# Patient Record
Sex: FEMALE | Race: WHITE | NOT HISPANIC OR LATINO
[De-identification: names, ages, dates, MRNs, and addresses within clinical notes are randomized per-mention and may not be internally consistent; named-entity substitution may affect disease eponyms.]

---

## 2022-08-25 PROBLEM — Z00.00 ENCOUNTER FOR PREVENTIVE HEALTH EXAMINATION: Status: ACTIVE | Noted: 2022-08-25

## 2022-08-26 ENCOUNTER — NON-APPOINTMENT (OUTPATIENT)
Age: 52
End: 2022-08-26

## 2022-10-18 ENCOUNTER — APPOINTMENT (OUTPATIENT)
Dept: GASTROENTEROLOGY | Facility: CLINIC | Age: 52
End: 2022-10-18

## 2022-10-18 ENCOUNTER — NON-APPOINTMENT (OUTPATIENT)
Age: 52
End: 2022-10-18

## 2022-10-18 VITALS
HEIGHT: 58 IN | RESPIRATION RATE: 16 BRPM | OXYGEN SATURATION: 98 % | WEIGHT: 122 LBS | SYSTOLIC BLOOD PRESSURE: 120 MMHG | BODY MASS INDEX: 25.61 KG/M2 | DIASTOLIC BLOOD PRESSURE: 80 MMHG | HEART RATE: 70 BPM

## 2022-10-18 DIAGNOSIS — Z12.11 ENCOUNTER FOR SCREENING FOR MALIGNANT NEOPLASM OF COLON: ICD-10-CM

## 2022-10-18 PROCEDURE — 99204 OFFICE O/P NEW MOD 45 MIN: CPT

## 2022-10-18 PROCEDURE — 82272 OCCULT BLD FECES 1-3 TESTS: CPT

## 2022-10-18 NOTE — HISTORY OF PRESENT ILLNESS
[FreeTextEntry1] : Patient is here for screening colonoscopy she has no chronic medical conditions.  She was sent for colonoscopy by her primary MD Nisha Branch and her GYN Dr. Richards\par \par Patient has no constipation diarrhea black stools bloody stools abdominal pain change in bowel habits or unintentional weight loss.  She has no family history of colon cancer or colon polyps in a first-degree relative.  This is her first colonoscopy.  She has a grandmother with colon cancer.\par     \par \par Patient has history of internal hemorrhoids which she states began during the time of childbirth.  States every once in a while she gets some rectal irritation if she has had frequent bowel movements.  She gets no rectal bleeding.  Symptoms resolved with Preparation H.

## 2022-10-18 NOTE — ASSESSMENT
[FreeTextEntry1] : A/P\par Screening colonoscopy\par MiraLAX prep\par  I discussed the risks and benefits of colonoscopy and patient was given opportunity to ask questions. Colonoscopy to r/o colon cancer, polyps, AVM's. Patient is medically optimized for the procedure\par -We will get CBC CMP from primary MD Nisha Branch \par \par hemorrhoid\par preparation H prn

## 2022-10-18 NOTE — PHYSICAL EXAM
[Alert] : alert [Normal Voice/Communication] : normal voice/communication [Healthy Appearing] : healthy appearing [No Acute Distress] : no acute distress [Sclera] : the sclera and conjunctiva were normal [No Respiratory Distress] : no respiratory distress [No Acc Muscle Use] : no accessory muscle use [Respiration, Rhythm And Depth] : normal respiratory rhythm and effort [Auscultation Breath Sounds / Voice Sounds] : lungs were clear to auscultation bilaterally [Normal S1, S2] : normal S1 and S2 [Heart Rate And Rhythm] : heart rate was normal and rhythm regular [Murmurs] : no murmurs [Bowel Sounds] : normal bowel sounds [Abdomen Tenderness] : non-tender [No Masses] : no abdominal mass palpated [Abdomen Soft] : soft [Oriented To Time, Place, And Person] : oriented to person, place, and time [Normal Sphincter Tone] : normal sphincter tone [No External Hemorrhoid] : no external hemorrhoids [No Rectal Mass] : no rectal mass [Occult Blood] : negative occult blood

## 2022-11-01 ENCOUNTER — TRANSCRIPTION ENCOUNTER (OUTPATIENT)
Age: 52
End: 2022-11-01

## 2023-02-16 ENCOUNTER — APPOINTMENT (OUTPATIENT)
Dept: GASTROENTEROLOGY | Facility: GI CENTER | Age: 53
End: 2023-02-16

## 2023-02-22 ENCOUNTER — NON-APPOINTMENT (OUTPATIENT)
Age: 53
End: 2023-02-22

## 2023-03-02 ENCOUNTER — NON-APPOINTMENT (OUTPATIENT)
Age: 53
End: 2023-03-02

## 2023-03-02 ENCOUNTER — APPOINTMENT (OUTPATIENT)
Dept: SPINE | Facility: CLINIC | Age: 53
End: 2023-03-02
Payer: COMMERCIAL

## 2023-03-02 VITALS
SYSTOLIC BLOOD PRESSURE: 115 MMHG | BODY MASS INDEX: 25.61 KG/M2 | OXYGEN SATURATION: 97 % | DIASTOLIC BLOOD PRESSURE: 78 MMHG | HEART RATE: 69 BPM | HEIGHT: 58 IN | WEIGHT: 122 LBS

## 2023-03-02 DIAGNOSIS — M50.20 OTHER CERVICAL DISC DISPLACEMENT, UNSPECIFIED CERVICAL REGION: ICD-10-CM

## 2023-03-02 DIAGNOSIS — Z82.49 FAMILY HISTORY OF ISCHEMIC HEART DISEASE AND OTHER DISEASES OF THE CIRCULATORY SYSTEM: ICD-10-CM

## 2023-03-02 DIAGNOSIS — Z80.9 FAMILY HISTORY OF MALIGNANT NEOPLASM, UNSPECIFIED: ICD-10-CM

## 2023-03-02 PROCEDURE — 99204 OFFICE O/P NEW MOD 45 MIN: CPT

## 2023-03-02 NOTE — DATA REVIEWED
[de-identified] : Cervical spine from [facility] on 2/16/2023 [de-identified] : Cervical spine from [facility] on 2/10/2023

## 2023-03-02 NOTE — ASSESSMENT
[FreeTextEntry1] : 52 year old female with left cervical radiculopathy secondary to large C6-7 disc herniation.  There is also some left C5-6 foraminal encroachment.  I have recommended she consider a C6-7 total disc replacement and possible C5-6 total disc replacement.  I would like to evaluate a CT scan and flexion-extension x-rays to determine whether at the C5-6 level needs to be addressed at the same time. Surgery was discussed in detail. Surgery was a result of shared decision making, the patient had no benefit from conservative- non surgical treatment, and all surgical options were reviewed. Risks related of surgery were discussed in detail - including but not limited to CSF leak, infection, nerve damage, temporary or permanent weakness or numbness, hemorrhage, cardiac events, and rarely even death, among others. Repair of CSF leak explained. An explanation of hardware/instrumentation that will be placed in the spine was reviewed and understood. \par \par I, Dr. Mick Hinds, evaluated this patient with Nurse Practitioner, Pat Gibson, and established the plan of care. I personally discussed this patient during the key portions of the history and exam with the Nurse Practitioner at the time of the visit. I agree with the assessment and plan as written, unless noted below.

## 2023-03-02 NOTE — PHYSICAL EXAM
[Person] : oriented to person [Place] : oriented to place [Time] : oriented to time [Short Term Intact] : short term memory intact [Remote Intact] : remote memory intact [Span Intact] : the attention span was normal [Concentration Intact] : normal concentrating ability [Fluency] : fluency intact [Comprehension] : comprehension intact [Current Events] : adequate knowledge of current events [Past History] : adequate knowledge of personal past history [Vocabulary] : adequate range of vocabulary [Cranial Nerves Optic (II)] : visual acuity intact bilaterally,  pupils equal round and reactive to light [Cranial Nerves Oculomotor (III)] : extraocular motion intact [Cranial Nerves Trigeminal (V)] : facial sensation intact symmetrically [Cranial Nerves Facial (VII)] : face symmetrical [Cranial Nerves Vestibulocochlear (VIII)] : hearing was intact bilaterally [Cranial Nerves Glossopharyngeal (IX)] : tongue and palate midline [Cranial Nerves Accessory (XI - Cranial And Spinal)] : head turning and shoulder shrug symmetric [Cranial Nerves Hypoglossal (XII)] : there was no tongue deviation with protrusion [Motor Tone] : muscle tone was normal in all four extremities [No Muscle Atrophy] : normal bulk in all four extremities [Abnormal Walk] : normal gait [Balance] : balance was intact [2+] : Ankle jerk left 2+ [Spurling's Same Side] : Positive Spurling's on same side [Past-pointing] : there was no past-pointing [Tremor] : no tremor present [Gracia] : Gracia's sign was not demonstrated [FreeTextEntry6] : Left tricep and  weakness 4/5 [FreeTextEntry7] : Decreased light touch left C7 [L'Hermitte's] : neck flexion did not produce tingling down the spine/arms [Spurling's - Opposite Side] : Negative Spurling's on opposite side

## 2023-03-02 NOTE — HISTORY OF PRESENT ILLNESS
[< 3 months] : less than 3 months [FreeTextEntry1] : Left scapula, left arm pain and left arm weakness [de-identified] : Ms. Bravo is a 52 year old right handed female here today for a second opinion. The patient states she woke up 3 weeks ago with pain in the left scapula, and left arm with radiation into the 3rd digit. The patient also reports triceps weakness, tingling in left hand and numbness of the left 2nd digit. Denies any trauma or injury. She works as a music  teacher and her symptoms are affecting her ability to perform her duties. She has completed 2 weeks of physical therapy and it is ongoing with minimal improvement. She has received 4 sessions of acupuncture and chiropractic therapy with no improvement. She is under the care of Dr. Mick Betts for pain management and has received a cervical epidural injection with significant improvement of her pain. The left arm weakness or paresthesia of hand are unchanged. Pain level prior to epidural injection was 8-9/10. Her current pain level is 0/10. The patient states she experiences some pain in the morning and takes Meloxicam 15 mg and Baclofen 10 mg with relief. \par Denies any trouble with her balance, bladder or bowel dysfunction. She had an EMG done yesterday (no report at this time).  Recent MRI scan shows a large left C6-7 disc herniation with significant impingement of the left side of the cord in the left C7 nerve root.  There is also some foraminal narrowing on the left at C5-6.

## 2023-03-02 NOTE — REASON FOR VISIT
[New Patient Visit] : a new patient visit [Other: _____] : [unfilled] [FreeTextEntry1] : Left scapula, left arm pain, and left arm weakness

## 2023-03-03 ENCOUNTER — NON-APPOINTMENT (OUTPATIENT)
Age: 53
End: 2023-03-03

## 2023-03-07 ENCOUNTER — APPOINTMENT (OUTPATIENT)
Dept: RADIOLOGY | Facility: CLINIC | Age: 53
End: 2023-03-07
Payer: COMMERCIAL

## 2023-03-07 ENCOUNTER — APPOINTMENT (OUTPATIENT)
Dept: CT IMAGING | Facility: CLINIC | Age: 53
End: 2023-03-07
Payer: COMMERCIAL

## 2023-03-07 PROCEDURE — 72125 CT NECK SPINE W/O DYE: CPT

## 2023-03-07 PROCEDURE — 72052 X-RAY EXAM NECK SPINE 6/>VWS: CPT

## 2023-03-08 ENCOUNTER — NON-APPOINTMENT (OUTPATIENT)
Age: 53
End: 2023-03-08

## 2023-03-09 ENCOUNTER — NON-APPOINTMENT (OUTPATIENT)
Age: 53
End: 2023-03-09

## 2023-03-29 ENCOUNTER — OUTPATIENT (OUTPATIENT)
Dept: OUTPATIENT SERVICES | Facility: HOSPITAL | Age: 53
LOS: 1 days | End: 2023-03-29
Payer: COMMERCIAL

## 2023-03-29 VITALS
OXYGEN SATURATION: 97 % | HEIGHT: 58 IN | HEART RATE: 90 BPM | DIASTOLIC BLOOD PRESSURE: 81 MMHG | WEIGHT: 121.03 LBS | TEMPERATURE: 98 F | RESPIRATION RATE: 18 BRPM | SYSTOLIC BLOOD PRESSURE: 119 MMHG

## 2023-03-29 DIAGNOSIS — M50.20 OTHER CERVICAL DISC DISPLACEMENT, UNSPECIFIED CERVICAL REGION: ICD-10-CM

## 2023-03-29 DIAGNOSIS — Z01.818 ENCOUNTER FOR OTHER PREPROCEDURAL EXAMINATION: ICD-10-CM

## 2023-03-29 DIAGNOSIS — Z98.891 HISTORY OF UTERINE SCAR FROM PREVIOUS SURGERY: Chronic | ICD-10-CM

## 2023-03-29 LAB
ANION GAP SERPL CALC-SCNC: 10 MMOL/L — SIGNIFICANT CHANGE UP (ref 5–17)
BLD GP AB SCN SERPL QL: NEGATIVE — SIGNIFICANT CHANGE UP
BUN SERPL-MCNC: 18 MG/DL — SIGNIFICANT CHANGE UP (ref 7–23)
CALCIUM SERPL-MCNC: 9.6 MG/DL — SIGNIFICANT CHANGE UP (ref 8.4–10.5)
CHLORIDE SERPL-SCNC: 104 MMOL/L — SIGNIFICANT CHANGE UP (ref 96–108)
CO2 SERPL-SCNC: 24 MMOL/L — SIGNIFICANT CHANGE UP (ref 22–31)
CREAT SERPL-MCNC: 0.65 MG/DL — SIGNIFICANT CHANGE UP (ref 0.5–1.3)
EGFR: 107 ML/MIN/1.73M2 — SIGNIFICANT CHANGE UP
GLUCOSE SERPL-MCNC: 96 MG/DL — SIGNIFICANT CHANGE UP (ref 70–99)
HCT VFR BLD CALC: 37.6 % — SIGNIFICANT CHANGE UP (ref 34.5–45)
HGB BLD-MCNC: 12.9 G/DL — SIGNIFICANT CHANGE UP (ref 11.5–15.5)
MCHC RBC-ENTMCNC: 31.6 PG — SIGNIFICANT CHANGE UP (ref 27–34)
MCHC RBC-ENTMCNC: 34.3 GM/DL — SIGNIFICANT CHANGE UP (ref 32–36)
MCV RBC AUTO: 92.2 FL — SIGNIFICANT CHANGE UP (ref 80–100)
NRBC # BLD: 0 /100 WBCS — SIGNIFICANT CHANGE UP (ref 0–0)
PLATELET # BLD AUTO: 336 K/UL — SIGNIFICANT CHANGE UP (ref 150–400)
POTASSIUM SERPL-MCNC: 3.8 MMOL/L — SIGNIFICANT CHANGE UP (ref 3.5–5.3)
POTASSIUM SERPL-SCNC: 3.8 MMOL/L — SIGNIFICANT CHANGE UP (ref 3.5–5.3)
RBC # BLD: 4.08 M/UL — SIGNIFICANT CHANGE UP (ref 3.8–5.2)
RBC # FLD: 12 % — SIGNIFICANT CHANGE UP (ref 10.3–14.5)
RH IG SCN BLD-IMP: POSITIVE — SIGNIFICANT CHANGE UP
SODIUM SERPL-SCNC: 138 MMOL/L — SIGNIFICANT CHANGE UP (ref 135–145)
WBC # BLD: 8.15 K/UL — SIGNIFICANT CHANGE UP (ref 3.8–10.5)
WBC # FLD AUTO: 8.15 K/UL — SIGNIFICANT CHANGE UP (ref 3.8–10.5)

## 2023-03-29 PROCEDURE — 87640 STAPH A DNA AMP PROBE: CPT

## 2023-03-29 PROCEDURE — 80048 BASIC METABOLIC PNL TOTAL CA: CPT

## 2023-03-29 PROCEDURE — 86900 BLOOD TYPING SEROLOGIC ABO: CPT

## 2023-03-29 PROCEDURE — G0463: CPT

## 2023-03-29 PROCEDURE — 86850 RBC ANTIBODY SCREEN: CPT

## 2023-03-29 PROCEDURE — 85027 COMPLETE CBC AUTOMATED: CPT

## 2023-03-29 PROCEDURE — 87641 MR-STAPH DNA AMP PROBE: CPT

## 2023-03-29 PROCEDURE — 86901 BLOOD TYPING SEROLOGIC RH(D): CPT

## 2023-03-29 NOTE — H&P PST ADULT - ASSESSMENT
DASI score:  DASI activity: walks 40 mins daily, cleaning  Loose teeth or denture: no     MP    ALFREDOI VTE 2.0 SCORE [CLOT updated 2019]    AGE RELATED RISK FACTORS                                                       MOBILITY RELATED FACTORS  [x ] Age 41-60 years                                            (1 Point)                    [ ] Bed rest                                                        (1 Point)  [ ] Age: 61-74 years                                           (2 Points)                  [ ] Plaster cast                                                   (2 Points)  [ ] Age= 75 years                                              (3 Points)                    [ ] Bed bound for more than 72 hours                 (2 Points)    DISEASE RELATED RISK FACTORS                                               GENDER SPECIFIC FACTORS  [ ] Edema in the lower extremities                       (1 Point)              [ ] Pregnancy                                                     (1 Point)  [ ] Varicose veins                                               (1 Point)                     [ ] Post-partum < 6 weeks                                   (1 Point)             [x ] BMI > 25 Kg/m2                                            (1 Point)                     [ ] Hormonal therapy  or oral contraception          (1 Point)                 [ ] Sepsis (in the previous month)                        (1 Point)               [ ] History of pregnancy complications                 (1 point)  [ ] Pneumonia or serious lung disease                                               [ ] Unexplained or recurrent                     (1 Point)           (in the previous month)                               (1 Point)  [ ] Abnormal pulmonary function test                     (1 Point)                 SURGERY RELATED RISK FACTORS  [ ] Acute myocardial infarction                              (1 Point)               [ ]  Section                                             (1 Point)  [ ] Congestive heart failure (in the previous month)  (1 Point)      [ ] Minor surgery                                                  (1 Point)   [ ] Inflammatory bowel disease                             (1 Point)               [ ] Arthroscopic surgery                                        (2 Points)  [ ] Central venous access                                      (2 Points)                [x ] General surgery lasting more than 45 minutes (2 points)  [ ] Malignancy- Present or previous                   (2 Points)                [ ] Elective arthroplasty                                         (5 points)    [ ] Stroke (in the previous month)                          (5 Points)                                                                                                                                                           HEMATOLOGY RELATED FACTORS                                                 TRAUMA RELATED RISK FACTORS  [ ] Prior episodes of VTE                                     (3 Points)                [ ] Fracture of the hip, pelvis, or leg                       (5 Points)  [ ] Positive family history for VTE                         (3 Points)             [ ] Acute spinal cord injury (in the previous month)  (5 Points)  [ ] Prothrombin 42275 A                                     (3 Points)               [ ] Paralysis  (less than 1 month)                             (5 Points)  [ ] Factor V Leiden                                             (3 Points)                  [ ] Multiple Trauma within 1 month                        (5 Points)  [ ] Lupus anticoagulants                                     (3 Points)                                                           [ ] Anticardiolipin antibodies                               (3 Points)                                                       [ ] High homocysteine in the blood                      (3 Points)                                             [ ] Other congenital or acquired thrombophilia      (3 Points)                                                [ ] Heparin induced thrombocytopenia                  (3 Points)                                     Total Score [          ] DASI score:  DASI activity: walks 40 mins daily, cleaning  Loose teeth or denture: no     MP    ALFREDOI VTE 2.0 SCORE [CLOT updated 2019]    AGE RELATED RISK FACTORS                                                       MOBILITY RELATED FACTORS  [x ] Age 41-60 years                                            (1 Point)                    [ ] Bed rest                                                        (1 Point)  [ ] Age: 61-74 years                                           (2 Points)                  [ ] Plaster cast                                                   (2 Points)  [ ] Age= 75 years                                              (3 Points)                    [ ] Bed bound for more than 72 hours                 (2 Points)    DISEASE RELATED RISK FACTORS                                               GENDER SPECIFIC FACTORS  [ ] Edema in the lower extremities                       (1 Point)              [ ] Pregnancy                                                     (1 Point)  [ ] Varicose veins                                               (1 Point)                     [ ] Post-partum < 6 weeks                                   (1 Point)             [x ] BMI > 25 Kg/m2                                            (1 Point)                     [ ] Hormonal therapy  or oral contraception          (1 Point)                 [ ] Sepsis (in the previous month)                        (1 Point)               [ ] History of pregnancy complications                 (1 point)  [ ] Pneumonia or serious lung disease                                               [ ] Unexplained or recurrent                     (1 Point)           (in the previous month)                               (1 Point)  [ ] Abnormal pulmonary function test                     (1 Point)                 SURGERY RELATED RISK FACTORS  [ ] Acute myocardial infarction                              (1 Point)               [ ]  Section                                             (1 Point)  [ ] Congestive heart failure (in the previous month)  (1 Point)      [ ] Minor surgery                                                  (1 Point)   [ ] Inflammatory bowel disease                             (1 Point)               [ ] Arthroscopic surgery                                        (2 Points)  [ ] Central venous access                                      (2 Points)                [x ] General surgery lasting more than 45 minutes (2 points)  [ ] Malignancy- Present or previous                   (2 Points)                [ ] Elective arthroplasty                                         (5 points)    [ ] Stroke (in the previous month)                          (5 Points)                                                                                                                                                           HEMATOLOGY RELATED FACTORS                                                 TRAUMA RELATED RISK FACTORS  [ ] Prior episodes of VTE                                     (3 Points)                [ ] Fracture of the hip, pelvis, or leg                       (5 Points)  [ ] Positive family history for VTE                         (3 Points)             [ ] Acute spinal cord injury (in the previous month)  (5 Points)  [ ] Prothrombin 53325 A                                     (3 Points)               [ ] Paralysis  (less than 1 month)                             (5 Points)  [ ] Factor V Leiden                                             (3 Points)                  [ ] Multiple Trauma within 1 month                        (5 Points)  [ ] Lupus anticoagulants                                     (3 Points)                                                           [ ] Anticardiolipin antibodies                               (3 Points)                                                       [ ] High homocysteine in the blood                      (3 Points)                                             [ ] Other congenital or acquired thrombophilia      (3 Points)                                                [ ] Heparin induced thrombocytopenia                  (3 Points)                                     Total Score [    4      ]

## 2023-03-29 NOTE — H&P PST ADULT - HISTORY OF PRESENT ILLNESS
53 yo F with PMHx significant herniated cervical disc who reports a few month history of left scapula and left arm pain associated with left arm weakness and numbness in left index finger. She underwent MRI which showed "herniated cervical disc." Conservative therapies include rest, ice, Baclofen, Meloxicam, PT, acupuncture, chiropractor and epidural injections with minimal relief of symptoms. She is scheduled for C5-6 and C6-7 Total Disc Replacement Anterior on 4/18/2023.     Denies Recent travel, Exposure or Covid symptoms  Covid test 4/15/23

## 2023-03-30 LAB
MRSA PCR RESULT.: SIGNIFICANT CHANGE UP
S AUREUS DNA NOSE QL NAA+PROBE: SIGNIFICANT CHANGE UP

## 2023-03-31 PROBLEM — M50.20 OTHER CERVICAL DISC DISPLACEMENT, UNSPECIFIED CERVICAL REGION: Chronic | Status: ACTIVE | Noted: 2023-03-29

## 2023-04-17 ENCOUNTER — TRANSCRIPTION ENCOUNTER (OUTPATIENT)
Age: 53
End: 2023-04-17

## 2023-04-18 ENCOUNTER — INPATIENT (INPATIENT)
Facility: HOSPITAL | Age: 53
LOS: 0 days | Discharge: HOME | DRG: 518 | End: 2023-04-19
Attending: NEUROLOGICAL SURGERY | Admitting: NEUROLOGICAL SURGERY
Payer: COMMERCIAL

## 2023-04-18 ENCOUNTER — APPOINTMENT (OUTPATIENT)
Dept: SPINE | Facility: HOSPITAL | Age: 53
End: 2023-04-18

## 2023-04-18 VITALS
SYSTOLIC BLOOD PRESSURE: 138 MMHG | OXYGEN SATURATION: 99 % | HEIGHT: 58 IN | DIASTOLIC BLOOD PRESSURE: 84 MMHG | WEIGHT: 121.03 LBS | RESPIRATION RATE: 17 BRPM | HEART RATE: 76 BPM | TEMPERATURE: 98 F

## 2023-04-18 DIAGNOSIS — M50.20 OTHER CERVICAL DISC DISPLACEMENT, UNSPECIFIED CERVICAL REGION: ICD-10-CM

## 2023-04-18 DIAGNOSIS — Z98.891 HISTORY OF UTERINE SCAR FROM PREVIOUS SURGERY: Chronic | ICD-10-CM

## 2023-04-18 DIAGNOSIS — Z98.51 TUBAL LIGATION STATUS: Chronic | ICD-10-CM

## 2023-04-18 LAB — RH IG SCN BLD-IMP: POSITIVE — SIGNIFICANT CHANGE UP

## 2023-04-18 PROCEDURE — 22856 TOT DISC ARTHRP 1NTRSPC CRV: CPT | Mod: GC

## 2023-04-18 PROCEDURE — 22858 TOT DISC ARTHRP 2ND LVL CRV: CPT | Mod: GC

## 2023-04-18 DEVICE — KIT A-LINE 1LUM 20G X 12CM SAFE KIT: Type: IMPLANTABLE DEVICE | Status: FUNCTIONAL

## 2023-04-18 DEVICE — SURGIFOAM PAD 8CM X 12.5CM X 10MM (100): Type: IMPLANTABLE DEVICE | Status: FUNCTIONAL

## 2023-04-18 DEVICE — DISC MOBI-C CERV 15X15 H5: Type: IMPLANTABLE DEVICE | Status: FUNCTIONAL

## 2023-04-18 DEVICE — DISTRACTION PIN 14MM (YELLOW): Type: IMPLANTABLE DEVICE | Status: FUNCTIONAL

## 2023-04-18 DEVICE — SURGIFLO MATRIX WITH THROMBIN KIT: Type: IMPLANTABLE DEVICE | Status: FUNCTIONAL

## 2023-04-18 DEVICE — DISC MOBI-C CERV 13X15 H5: Type: IMPLANTABLE DEVICE | Status: FUNCTIONAL

## 2023-04-18 RX ORDER — HYDROMORPHONE HYDROCHLORIDE 2 MG/ML
0.5 INJECTION INTRAMUSCULAR; INTRAVENOUS; SUBCUTANEOUS
Refills: 0 | Status: DISCONTINUED | OUTPATIENT
Start: 2023-04-18 | End: 2023-04-18

## 2023-04-18 RX ORDER — HYDROMORPHONE HYDROCHLORIDE 2 MG/ML
0.5 INJECTION INTRAMUSCULAR; INTRAVENOUS; SUBCUTANEOUS EVERY 4 HOURS
Refills: 0 | Status: DISCONTINUED | OUTPATIENT
Start: 2023-04-18 | End: 2023-04-19

## 2023-04-18 RX ORDER — ONDANSETRON 8 MG/1
4 TABLET, FILM COATED ORAL ONCE
Refills: 0 | Status: DISCONTINUED | OUTPATIENT
Start: 2023-04-18 | End: 2023-04-18

## 2023-04-18 RX ORDER — SODIUM CHLORIDE 9 MG/ML
1000 INJECTION, SOLUTION INTRAVENOUS
Refills: 0 | Status: DISCONTINUED | OUTPATIENT
Start: 2023-04-18 | End: 2023-04-18

## 2023-04-18 RX ORDER — CEFAZOLIN SODIUM 1 G
2000 VIAL (EA) INJECTION EVERY 8 HOURS
Refills: 0 | Status: COMPLETED | OUTPATIENT
Start: 2023-04-18 | End: 2023-04-19

## 2023-04-18 RX ORDER — CEFAZOLIN SODIUM 1 G
2000 VIAL (EA) INJECTION ONCE
Refills: 0 | Status: COMPLETED | OUTPATIENT
Start: 2023-04-18 | End: 2023-04-18

## 2023-04-18 RX ORDER — APREPITANT 80 MG/1
40 CAPSULE ORAL ONCE
Refills: 0 | Status: COMPLETED | OUTPATIENT
Start: 2023-04-18 | End: 2023-04-18

## 2023-04-18 RX ORDER — OXYCODONE HYDROCHLORIDE 5 MG/1
5 TABLET ORAL EVERY 4 HOURS
Refills: 0 | Status: DISCONTINUED | OUTPATIENT
Start: 2023-04-18 | End: 2023-04-19

## 2023-04-18 RX ORDER — FAMOTIDINE 10 MG/ML
20 INJECTION INTRAVENOUS EVERY 12 HOURS
Refills: 0 | Status: DISCONTINUED | OUTPATIENT
Start: 2023-04-18 | End: 2023-04-19

## 2023-04-18 RX ORDER — METHOCARBAMOL 500 MG/1
500 TABLET, FILM COATED ORAL EVERY 8 HOURS
Refills: 0 | Status: DISCONTINUED | OUTPATIENT
Start: 2023-04-18 | End: 2023-04-19

## 2023-04-18 RX ORDER — SENNA PLUS 8.6 MG/1
2 TABLET ORAL AT BEDTIME
Refills: 0 | Status: DISCONTINUED | OUTPATIENT
Start: 2023-04-18 | End: 2023-04-19

## 2023-04-18 RX ORDER — ONDANSETRON 8 MG/1
4 TABLET, FILM COATED ORAL EVERY 6 HOURS
Refills: 0 | Status: DISCONTINUED | OUTPATIENT
Start: 2023-04-18 | End: 2023-04-19

## 2023-04-18 RX ORDER — SODIUM CHLORIDE 9 MG/ML
1000 INJECTION INTRAMUSCULAR; INTRAVENOUS; SUBCUTANEOUS
Refills: 0 | Status: DISCONTINUED | OUTPATIENT
Start: 2023-04-18 | End: 2023-04-19

## 2023-04-18 RX ORDER — DEXAMETHASONE 0.5 MG/5ML
4 ELIXIR ORAL EVERY 6 HOURS
Refills: 0 | Status: COMPLETED | OUTPATIENT
Start: 2023-04-18 | End: 2023-04-19

## 2023-04-18 RX ORDER — GABAPENTIN 400 MG/1
300 CAPSULE ORAL
Refills: 0 | Status: DISCONTINUED | OUTPATIENT
Start: 2023-04-18 | End: 2023-04-19

## 2023-04-18 RX ORDER — ACETAMINOPHEN 500 MG
1000 TABLET ORAL EVERY 8 HOURS
Refills: 0 | Status: DISCONTINUED | OUTPATIENT
Start: 2023-04-18 | End: 2023-04-19

## 2023-04-18 RX ORDER — POLYETHYLENE GLYCOL 3350 17 G/17G
17 POWDER, FOR SOLUTION ORAL DAILY
Refills: 0 | Status: DISCONTINUED | OUTPATIENT
Start: 2023-04-18 | End: 2023-04-19

## 2023-04-18 RX ADMIN — OXYCODONE HYDROCHLORIDE 5 MILLIGRAM(S): 5 TABLET ORAL at 22:02

## 2023-04-18 RX ADMIN — HYDROMORPHONE HYDROCHLORIDE 0.5 MILLIGRAM(S): 2 INJECTION INTRAMUSCULAR; INTRAVENOUS; SUBCUTANEOUS at 22:19

## 2023-04-18 RX ADMIN — METHOCARBAMOL 500 MILLIGRAM(S): 500 TABLET, FILM COATED ORAL at 20:58

## 2023-04-18 RX ADMIN — Medication 4 MILLIGRAM(S): at 19:51

## 2023-04-18 RX ADMIN — Medication 100 MILLIGRAM(S): at 18:29

## 2023-04-18 RX ADMIN — Medication 4 MILLIGRAM(S): at 23:51

## 2023-04-18 RX ADMIN — OXYCODONE HYDROCHLORIDE 5 MILLIGRAM(S): 5 TABLET ORAL at 21:02

## 2023-04-18 RX ADMIN — HYDROMORPHONE HYDROCHLORIDE 0.5 MILLIGRAM(S): 2 INJECTION INTRAMUSCULAR; INTRAVENOUS; SUBCUTANEOUS at 13:11

## 2023-04-18 RX ADMIN — HYDROMORPHONE HYDROCHLORIDE 0.5 MILLIGRAM(S): 2 INJECTION INTRAMUSCULAR; INTRAVENOUS; SUBCUTANEOUS at 23:19

## 2023-04-18 RX ADMIN — GABAPENTIN 300 MILLIGRAM(S): 400 CAPSULE ORAL at 19:51

## 2023-04-18 RX ADMIN — HYDROMORPHONE HYDROCHLORIDE 0.5 MILLIGRAM(S): 2 INJECTION INTRAMUSCULAR; INTRAVENOUS; SUBCUTANEOUS at 12:56

## 2023-04-18 RX ADMIN — APREPITANT 40 MILLIGRAM(S): 80 CAPSULE ORAL at 07:56

## 2023-04-18 NOTE — PROGRESS NOTE ADULT - ASSESSMENT
Patient seen and examined s/p C5-7 TDR. Doing well. Awake, alert, oriented x 3. SOTOMAYOR strongly. SILT. Wound intact; no swelling or hematoma    -No drain  -PACU for 6 hours and then floor  -Decadron 4q6  -HOB elevated 30 degrees at all times.  -Q4 neurochecks  -Q4 vitals  -Pain control  -Advance diet as tolerated  -PT/OT

## 2023-04-18 NOTE — PHYSICAL THERAPY INITIAL EVALUATION ADULT - ADDITIONAL COMMENTS
Pt lives in a house with  and dtr. Has 5 steps to enter (+HR) and 5+5 steps inside (+HR). Reports being independent with functional mobility/ADLs without AD. +drive, +work.

## 2023-04-18 NOTE — PATIENT PROFILE ADULT - FALL HARM RISK - RISK INTERVENTIONS
Assistance OOB with selected safe patient handling equipment/Assistance with ambulation/Communicate Fall Risk and Risk Factors to all staff, patient, and family/Monitor gait and stability/Reinforce activity limits and safety measures with patient and family/Sit up slowly, dangle for a short time, stand at bedside before walking/Use of alarms - bed, chair and/or voice tab/Visual Cue: Yellow wristband/Bed in lowest position, wheels locked, appropriate side rails in place/Call bell, personal items and telephone in reach/Instruct patient to call for assistance before getting out of bed or chair/Non-slip footwear when patient is out of bed/[place] to call system/Physically safe environment - no spills, clutter or unnecessary equipment/Purposeful Proactive Rounding/Room/bathroom lighting operational, light cord in reach

## 2023-04-18 NOTE — PATIENT PROFILE ADULT - FALL HARM RISK - UNIVERSAL INTERVENTIONS
Bed in lowest position, wheels locked, appropriate side rails in place/Call bell, personal items and telephone in reach/Instruct patient to call for assistance before getting out of bed or chair/Non-slip footwear when patient is out of bed/[place] to call system/Physically safe environment - no spills, clutter or unnecessary equipment/Purposeful Proactive Rounding/Room/bathroom lighting operational, light cord in reach

## 2023-04-18 NOTE — PROGRESS NOTE ADULT - SUBJECTIVE AND OBJECTIVE BOX
Patient seen and examined s/p C5-7 TDR. Doing well. Awake, alert, oriented x 3. SOTOMAYOR strongly. SILT. Wound intact; no swelling or hematoma    T(C): 36.5 (04-18-23 @ 16:00), Max: 36.7 (04-18-23 @ 07:07)  HR: 85 (04-18-23 @ 16:15) (69 - 92)  BP: 107/67 (04-18-23 @ 16:15) (100/55 - 138/84)  RR: 18 (04-18-23 @ 16:00) (16 - 18)  SpO2: 95% (04-18-23 @ 16:15) (93% - 99%)                    CAPILLARY BLOOD GLUCOSE

## 2023-04-18 NOTE — PHYSICAL THERAPY INITIAL EVALUATION ADULT - PLANNED THERAPY INTERVENTIONS, PT EVAL
Goal: Pt will be able to negotiate one flight of stairs independently with single handrail and step over step pattern in 1 week./balance training/bed mobility training/gait training/strengthening/transfer training

## 2023-04-18 NOTE — PHYSICAL THERAPY INITIAL EVALUATION ADULT - GENERAL OBSERVATIONS, REHAB EVAL
Pt rec'd semisupine in bed, in NAD, +IV, +a-line, +PACU monitoring. Agreeable to PT. RN cleared pt to be seen.

## 2023-04-18 NOTE — PHYSICAL THERAPY INITIAL EVALUATION ADULT - PERTINENT HX OF CURRENT PROBLEM, REHAB EVAL
51 yo F with PMHx significant herniated cervical disc who reports a few month history of left scapula and left arm pain associated with left arm weakness and numbness in left index finger. She underwent MRI which showed "herniated cervical disc." Conservative therapies include rest, ice, Baclofen, Meloxicam, PT, acupuncture, chiropractor and epidural injections with minimal relief of symptoms. She is scheduled for C5-6 and C6-7 Total Disc Replacement Anterior on 4/18/2023.

## 2023-04-19 ENCOUNTER — TRANSCRIPTION ENCOUNTER (OUTPATIENT)
Age: 53
End: 2023-04-19

## 2023-04-19 VITALS
HEART RATE: 85 BPM | SYSTOLIC BLOOD PRESSURE: 115 MMHG | TEMPERATURE: 99 F | OXYGEN SATURATION: 96 % | RESPIRATION RATE: 18 BRPM | DIASTOLIC BLOOD PRESSURE: 72 MMHG

## 2023-04-19 LAB
ANION GAP SERPL CALC-SCNC: 10 MMOL/L — SIGNIFICANT CHANGE UP (ref 5–17)
BASOPHILS # BLD AUTO: 0.02 K/UL — SIGNIFICANT CHANGE UP (ref 0–0.2)
BASOPHILS NFR BLD AUTO: 0.1 % — SIGNIFICANT CHANGE UP (ref 0–2)
BUN SERPL-MCNC: 9 MG/DL — SIGNIFICANT CHANGE UP (ref 7–23)
CALCIUM SERPL-MCNC: 8.6 MG/DL — SIGNIFICANT CHANGE UP (ref 8.4–10.5)
CHLORIDE SERPL-SCNC: 106 MMOL/L — SIGNIFICANT CHANGE UP (ref 96–108)
CO2 SERPL-SCNC: 21 MMOL/L — LOW (ref 22–31)
CREAT SERPL-MCNC: 0.61 MG/DL — SIGNIFICANT CHANGE UP (ref 0.5–1.3)
EGFR: 108 ML/MIN/1.73M2 — SIGNIFICANT CHANGE UP
EOSINOPHIL # BLD AUTO: 0 K/UL — SIGNIFICANT CHANGE UP (ref 0–0.5)
EOSINOPHIL NFR BLD AUTO: 0 % — SIGNIFICANT CHANGE UP (ref 0–6)
GLUCOSE SERPL-MCNC: 144 MG/DL — HIGH (ref 70–99)
HCT VFR BLD CALC: 34.2 % — LOW (ref 34.5–45)
HGB BLD-MCNC: 11.5 G/DL — SIGNIFICANT CHANGE UP (ref 11.5–15.5)
IMM GRANULOCYTES NFR BLD AUTO: 0.9 % — SIGNIFICANT CHANGE UP (ref 0–0.9)
LYMPHOCYTES # BLD AUTO: 0.89 K/UL — LOW (ref 1–3.3)
LYMPHOCYTES # BLD AUTO: 4.8 % — LOW (ref 13–44)
MCHC RBC-ENTMCNC: 30.7 PG — SIGNIFICANT CHANGE UP (ref 27–34)
MCHC RBC-ENTMCNC: 33.6 GM/DL — SIGNIFICANT CHANGE UP (ref 32–36)
MCV RBC AUTO: 91.2 FL — SIGNIFICANT CHANGE UP (ref 80–100)
MONOCYTES # BLD AUTO: 0.77 K/UL — SIGNIFICANT CHANGE UP (ref 0–0.9)
MONOCYTES NFR BLD AUTO: 4.2 % — SIGNIFICANT CHANGE UP (ref 2–14)
NEUTROPHILS # BLD AUTO: 16.69 K/UL — HIGH (ref 1.8–7.4)
NEUTROPHILS NFR BLD AUTO: 90 % — HIGH (ref 43–77)
NRBC # BLD: 0 /100 WBCS — SIGNIFICANT CHANGE UP (ref 0–0)
PLATELET # BLD AUTO: 312 K/UL — SIGNIFICANT CHANGE UP (ref 150–400)
POTASSIUM SERPL-MCNC: 3.9 MMOL/L — SIGNIFICANT CHANGE UP (ref 3.5–5.3)
POTASSIUM SERPL-SCNC: 3.9 MMOL/L — SIGNIFICANT CHANGE UP (ref 3.5–5.3)
RBC # BLD: 3.75 M/UL — LOW (ref 3.8–5.2)
RBC # FLD: 12.2 % — SIGNIFICANT CHANGE UP (ref 10.3–14.5)
SODIUM SERPL-SCNC: 137 MMOL/L — SIGNIFICANT CHANGE UP (ref 135–145)
WBC # BLD: 18.53 K/UL — HIGH (ref 3.8–10.5)
WBC # FLD AUTO: 18.53 K/UL — HIGH (ref 3.8–10.5)

## 2023-04-19 PROCEDURE — C1889: CPT

## 2023-04-19 PROCEDURE — C1769: CPT

## 2023-04-19 PROCEDURE — 97110 THERAPEUTIC EXERCISES: CPT

## 2023-04-19 PROCEDURE — 97116 GAIT TRAINING THERAPY: CPT

## 2023-04-19 PROCEDURE — C1713: CPT

## 2023-04-19 PROCEDURE — 85025 COMPLETE CBC W/AUTO DIFF WBC: CPT

## 2023-04-19 PROCEDURE — 97161 PT EVAL LOW COMPLEX 20 MIN: CPT

## 2023-04-19 PROCEDURE — 76000 FLUOROSCOPY <1 HR PHYS/QHP: CPT

## 2023-04-19 PROCEDURE — 80048 BASIC METABOLIC PNL TOTAL CA: CPT

## 2023-04-19 RX ORDER — ACETAMINOPHEN 500 MG
2 TABLET ORAL
Qty: 0 | Refills: 0 | DISCHARGE
Start: 2023-04-19

## 2023-04-19 RX ORDER — GABAPENTIN 400 MG/1
1 CAPSULE ORAL
Qty: 60 | Refills: 0
Start: 2023-04-19 | End: 2023-05-18

## 2023-04-19 RX ORDER — SENNA PLUS 8.6 MG/1
2 TABLET ORAL
Qty: 0 | Refills: 0 | DISCHARGE
Start: 2023-04-19

## 2023-04-19 RX ORDER — METHOCARBAMOL 500 MG/1
1 TABLET, FILM COATED ORAL
Qty: 45 | Refills: 0
Start: 2023-04-19 | End: 2023-05-03

## 2023-04-19 RX ORDER — GABAPENTIN 400 MG/1
1 CAPSULE ORAL
Qty: 30 | Refills: 0
Start: 2023-04-19 | End: 2023-05-18

## 2023-04-19 RX ORDER — BACLOFEN 100 %
1 POWDER (GRAM) MISCELLANEOUS
Refills: 0 | DISCHARGE

## 2023-04-19 RX ORDER — OXYCODONE HYDROCHLORIDE 5 MG/1
1 TABLET ORAL
Qty: 28 | Refills: 0
Start: 2023-04-19

## 2023-04-19 RX ORDER — FAMOTIDINE 10 MG/ML
1 INJECTION INTRAVENOUS
Qty: 20 | Refills: 0
Start: 2023-04-19 | End: 2023-04-28

## 2023-04-19 RX ORDER — POLYETHYLENE GLYCOL 3350 17 G/17G
17 POWDER, FOR SOLUTION ORAL
Qty: 0 | Refills: 0 | DISCHARGE
Start: 2023-04-19

## 2023-04-19 RX ADMIN — Medication 1 TABLET(S): at 12:38

## 2023-04-19 RX ADMIN — Medication 4 MILLIGRAM(S): at 12:40

## 2023-04-19 RX ADMIN — GABAPENTIN 300 MILLIGRAM(S): 400 CAPSULE ORAL at 05:33

## 2023-04-19 RX ADMIN — Medication 4 MILLIGRAM(S): at 05:33

## 2023-04-19 RX ADMIN — Medication 1000 MILLIGRAM(S): at 04:00

## 2023-04-19 RX ADMIN — Medication 100 MILLIGRAM(S): at 00:44

## 2023-04-19 RX ADMIN — Medication 1000 MILLIGRAM(S): at 03:02

## 2023-04-19 RX ADMIN — Medication 1000 MILLIGRAM(S): at 10:53

## 2023-04-19 RX ADMIN — METHOCARBAMOL 500 MILLIGRAM(S): 500 TABLET, FILM COATED ORAL at 03:02

## 2023-04-19 RX ADMIN — POLYETHYLENE GLYCOL 3350 17 GRAM(S): 17 POWDER, FOR SOLUTION ORAL at 12:36

## 2023-04-19 RX ADMIN — METHOCARBAMOL 500 MILLIGRAM(S): 500 TABLET, FILM COATED ORAL at 12:38

## 2023-04-19 NOTE — DISCHARGE NOTE PROVIDER - NSDCCPCAREPLAN_GEN_ALL_CORE_FT
PRINCIPAL DISCHARGE DIAGNOSIS  Diagnosis: Cervical herniated disc  Assessment and Plan of Treatment: you had C5-6 and C6-7 disc replacement. You doing well and you are stable for discharge home today on Medrol dose hailey, muscles relaxant robaxin and pain medication oxycodone. Please see Dr Hinds within 2 weeks for follow up

## 2023-04-19 NOTE — DISCHARGE NOTE PROVIDER - CARE PROVIDER_API CALL
Mick Hinds (MD)  Neurosurgery  [address], [address]  [place] [address]  Phone: [phone]  Fax: [phone]  Follow Up Time:

## 2023-04-19 NOTE — DISCHARGE NOTE PROVIDER - HOSPITAL COURSE
51 yo F with PMHx significant herniated cervical disc who reports a few month history of left scapula and left arm pain associated with left arm weakness and numbness in left index finger. She underwent MRI which showed "herniated cervical disc." Conservative therapies include rest, ice, Baclofen, Meloxicam, PT, acupuncture, chiropractor and epidural injections with minimal relief of symptoms. She is scheduled for C5-6 and C6-7 Total Disc Replacement Anterior on 4/18/2023.   Patient is post-op day # 1 s/p C5-6 and C6-7 disc replacement. There was no post op complication. She stated that her Lt arm numbness has improved. She was seen by PT and has no therapy needs. She is stable for discharge home today 53 yo F with PMHx significant herniated cervical disc who reports a few month history of left scapula and left arm pain associated with left arm weakness and numbness in left index finger. She underwent MRI which showed "herniated cervical disc." Conservative therapies include rest, ice, Baclofen, Meloxicam, PT, acupuncture, chiropractor and epidural injections with minimal relief of symptoms. She is scheduled for C5-6 and C6-7 Total Disc Replacement Anterior on 4/18/2023.   Patient is post-op day # 1 s/p C5-6 and C6-7 disc replacement. There was no post op complication. She stated that her Lt fingers numbness and arm pain are gone. Lt scapula pain is still there.   She was seen by PT and has no therapy needs. She is stable for discharge home today

## 2023-04-19 NOTE — DISCHARGE NOTE PROVIDER - NSDCFUADDINST_GEN_ALL_CORE_FT
Please keep incision clean and dry, you can shower in 3 days, do not submerge wound in water for prolonged periods of time, pat dry after showering, and do not use any creams or ointments to incision.  Please do not engage in strenuous activity, heavy lifting, drive, or return to work or school until cleared by surgeon.  Please notify physician if fevers, bleeding, swelling, pain not relieved by medication, increased sluggishness or irritability, increased nausea or vomiting, inability to tolerate foods or liquids.  No heavy lifting/straining, Showering allowed, Stairs allowed, Walking - Indoors allowed, Walking - Outdoors allowed, Follow Instructions Provided by your Surgical Team  Do not start any Motrin /Aspirin NSAIDS( Motrin, Advil.... until cleared by your neurosurgeon

## 2023-04-19 NOTE — DISCHARGE NOTE PROVIDER - NSDCMRMEDTOKEN_GEN_ALL_CORE_FT
baclofen 10 mg oral tablet: 1 orally once a day  meloxicam 15 mg oral tablet: 1 orally once a day   acetaminophen 500 mg oral tablet: 2 tab(s) orally every 8 hours  meloxicam 15 mg oral tablet: 1 orally once a day  methocarbamol 500 mg oral tablet: 1 tab(s) orally 3 times a day  Multiple Vitamins oral tablet: 1 tab(s) orally once a day  Neurontin 300 mg oral capsule: 1 cap(s) orally 2 times a day  oxyCODONE 5 mg oral capsule: 1 cap(s) orally every 6 hours as needed for Pain MDD: 4  Pepcid 20 mg oral tablet: 1 tab(s) orally 2 times a day  polyethylene glycol 3350 oral powder for reconstitution: 17 gram(s) orally once a day  senna leaf extract oral tablet: 2 tab(s) orally once a day (at bedtime)

## 2023-04-19 NOTE — DISCHARGE NOTE NURSING/CASE MANAGEMENT/SOCIAL WORK - NSDCPEFALRISK_GEN_ALL_CORE
For information on Fall & Injury Prevention, visit: [email or web address] OR  [email or web address] OR  https://www.cdc.gov/steadi/patient.html

## 2023-04-19 NOTE — PROGRESS NOTE ADULT - SUBJECTIVE AND OBJECTIVE BOX
HPI:  53 yo F with PMHx significant herniated cervical disc who reports a few month history of left scapula and left arm pain associated with left arm weakness and numbness in left index finger. She underwent MRI which showed "herniated cervical disc." Conservative therapies include rest, ice, Baclofen, Meloxicam, PT, acupuncture, chiropractor and epidural injections with minimal relief of symptoms. She is scheduled for C5-6 and C6-7 Total Disc Replacement Anterior on 4/18/2023.     Denies Recent travel, Exposure or Covid symptoms  Covid test 4/15/23 (29 Mar 2023 15:07)    OVERNIGHT EVENTS:  Vital Signs Last 24 Hrs  T(C): 37.1 (19 Apr 2023 08:13), Max: 37.1 (19 Apr 2023 08:13)  T(F): 98.7 (19 Apr 2023 08:13), Max: 98.7 (19 Apr 2023 08:13)  HR: 85 (19 Apr 2023 08:13) (67 - 92)  BP: 115/72 (19 Apr 2023 08:13) (100/55 - 126/63)  BP(mean): 78 (18 Apr 2023 19:00) (71 - 90)  RR: 18 (19 Apr 2023 08:13) (14 - 19)  SpO2: 96% (19 Apr 2023 08:13) (93% - 98%)    Parameters below as of 19 Apr 2023 08:13  Patient On (Oxygen Delivery Method): room air        I&O's Detail    18 Apr 2023 07:01  -  19 Apr 2023 07:00  --------------------------------------------------------  IN:    Oral Fluid: 200 mL  Total IN: 200 mL    OUT:  Total OUT: 0 mL    Total NET: 200 mL        I&O's Summary    18 Apr 2023 07:01  -  19 Apr 2023 07:00  --------------------------------------------------------  IN: 200 mL / OUT: 0 mL / NET: 200 mL        PHYSICAL EXAM:  Neurological:    Motor exam:         [] Upper extremity                 D             B          T          WE       WF      HI                                               R         5/5        5/5        5/5       5/5     5/5       5/5                                               L          5/5        5/5        5/5       5/5     5/5       5/5         [] Lower extremity               HF            KF        KE         EHL        FHL                                               R        5/5        5/5        5/5       5/5         5/5                                               L         5/5        5/5       5/5       5/5          5/5                                                        [] warm well perfused; capillary refill <3 seconds     Sensation: [] intact to light touch  [] decreased:       Gait    Cardiovascular: Regular  Respiratory: Clear bilaterally  Gastrointestinal: Abd soft + BS non tender  Genitourinary:  Extremities: -C/C/E  Incision/Wound: Intact    TUBES/LINES:  [] CVC  [] A-line  [] Lumbar Drain  [] Ventriculostomy  [] Other    DIET:  [] NPO  [] Mechanical  [] Tube feeds    LABS:                        11.5   18.53 )-----------( 312      ( 19 Apr 2023 06:16 )             34.2     04-19    137  |  106  |  9   ----------------------------<  144<H>  3.9   |  21<L>  |  0.61    Ca    8.6      19 Apr 2023 06:17              CAPILLARY BLOOD GLUCOSE              Drug Levels: [] N/A    CSF Analysis: [] N/A      Allergies    No Known Allergies    Intolerances      MEDICATIONS:  Antibiotics:    Neuro:  acetaminophen     Tablet .. 1000 milliGRAM(s) Oral every 8 hours  gabapentin 300 milliGRAM(s) Oral two times a day  HYDROmorphone  Injectable 0.5 milliGRAM(s) IV Push every 4 hours PRN  methocarbamol 500 milliGRAM(s) Oral every 8 hours  ondansetron   Disintegrating Tablet 4 milliGRAM(s) Oral every 6 hours  oxyCODONE    IR 5 milliGRAM(s) Oral every 4 hours PRN    Anticoagulation:    OTHER:  dexAMETHasone     Tablet 4 milliGRAM(s) Oral every 6 hours  famotidine    Tablet 20 milliGRAM(s) Oral every 12 hours PRN  polyethylene glycol 3350 17 Gram(s) Oral daily  senna 2 Tablet(s) Oral at bedtime    IVF:  multivitamin 1 Tablet(s) Oral daily  sodium chloride 0.9%. 1000 milliLiter(s) IV Continuous <Continuous>    CULTURES:    RADIOLOGY & ADDITIONAL TESTS:                51 yo F with PMHx significant herniated cervical disc who reports a few month history of left scapula and left arm pain associated with left arm weakness and numbness in left index finger. She underwent MRI which showed "herniated cervical disc." Conservative therapies include rest, ice, Baclofen, Meloxicam, PT, acupuncture, chiropractor and epidural injections with minimal relief of symptoms. She is scheduled for C5-6 and C6-7 Total Disc Replacement Anterior on 4/18/2023.      Post-op day # 1 s/p C5-7 TDR    OVERNIGHT EVENTS: no acute event, patient stated that her Left finger numbness and arm pain are gone    Vital Signs Last 24 Hrs  T(C): 37.1 (19 Apr 2023 08:13), Max: 37.1 (19 Apr 2023 08:13)  T(F): 98.7 (19 Apr 2023 08:13), Max: 98.7 (19 Apr 2023 08:13)  HR: 85 (19 Apr 2023 08:13) (67 - 92)  BP: 115/72 (19 Apr 2023 08:13) (100/55 - 126/63)  BP(mean): 78 (18 Apr 2023 19:00) (71 - 90)  RR: 18 (19 Apr 2023 08:13) (14 - 19)  SpO2: 96% (19 Apr 2023 08:13) (93% - 98%)    Parameters below as of 19 Apr 2023 08:13  Patient On (Oxygen Delivery Method): room air        I&O's Detail    18 Apr 2023 07:01  -  19 Apr 2023 07:00  --------------------------------------------------------  IN:    Oral Fluid: 200 mL  Total IN: 200 mL    OUT:  Total OUT: 0 mL    Total NET: 200 mL        I&O's Summary    18 Apr 2023 07:01  -  19 Apr 2023 07:00  --------------------------------------------------------  IN: 200 mL / OUT: 0 mL / NET: 200 mL        PHYSICAL EXAM:  Neurological:    Motor exam:         [X] Upper extremity                 D             B          T          WE       WF      HI                                               R         5/5        5/5        5/5       5/5     5/5       5/5                                               L          5/5        5/5        5/5       5/5     5/5       5/5         [X] Lower extremity               HF            KF        KE         EHL        FHL                                               R        5/5        5/5        5/5       5/5         5/5                                               L         5/5        5/5       5/5       5/5          5/5                                                        [x] warm well perfused; capillary refill <3 seconds     Sensation: [x] intact to light touch  [] decreased:       Gait: intact    Cardiovascular: Regular  Respiratory: Clear bilaterally  Gastrointestinal: Abd soft + BS non tender  Genitourinary:  Extremities: -C/C/E  Incision/Wound: Intact    TUBES/LINES:  [] CVC  [] A-line  [] Lumbar Drain  [] Ventriculostomy  [] Other    DIET:  [] NPO  [] Mechanical  [] Tube feeds    LABS:                        11.5   18.53 )-----------( 312      ( 19 Apr 2023 06:16 )             34.2     04-19    137  |  106  |  9   ----------------------------<  144<H>  3.9   |  21<L>  |  0.61    Ca    8.6      19 Apr 2023 06:17              CAPILLARY BLOOD GLUCOSE              Drug Levels: [] N/A    CSF Analysis: [] N/A      Allergies    No Known Allergies    Intolerances      MEDICATIONS:  Antibiotics:    Neuro:  acetaminophen     Tablet .. 1000 milliGRAM(s) Oral every 8 hours  gabapentin 300 milliGRAM(s) Oral two times a day  HYDROmorphone  Injectable 0.5 milliGRAM(s) IV Push every 4 hours PRN  methocarbamol 500 milliGRAM(s) Oral every 8 hours  ondansetron   Disintegrating Tablet 4 milliGRAM(s) Oral every 6 hours  oxyCODONE    IR 5 milliGRAM(s) Oral every 4 hours PRN    Anticoagulation:    OTHER:  dexAMETHasone     Tablet 4 milliGRAM(s) Oral every 6 hours  famotidine    Tablet 20 milliGRAM(s) Oral every 12 hours PRN  polyethylene glycol 3350 17 Gram(s) Oral daily  senna 2 Tablet(s) Oral at bedtime    IVF:  multivitamin 1 Tablet(s) Oral daily  sodium chloride 0.9%. 1000 milliLiter(s) IV Continuous <Continuous>    CULTURES:    RADIOLOGY & ADDITIONAL TESTS:

## 2023-04-19 NOTE — DISCHARGE NOTE NURSING/CASE MANAGEMENT/SOCIAL WORK - PATIENT PORTAL LINK FT
You can access the FollowMyHealth Patient Portal offered by [facility] by registering at the following website: [email or web address]. By joining [facility]’s FollowMyHealth portal, you will also be able to view your health information using other applications (apps) compatible with our system.

## 2023-04-19 NOTE — PROGRESS NOTE ADULT - ASSESSMENT
51 yo F with PMHx significant herniated cervical disc who reports a few month history of left scapula and left arm pain associated with left arm weakness and numbness in left index finger. She underwent MRI which showed "herniated cervical disc." Conservative therapies include rest, ice, Baclofen, Meloxicam, PT, acupuncture, chiropractor and epidural injections with minimal relief of symptoms. She is scheduled for C5-6 and C6-7 Total Disc Replacement Anterior on 4/18/2023.     Denies Recent travel, Exposure or Covid symptoms  Covid test 4/15/23 (29 Mar 2023 15:07)    52y Female s/p    PLAN:  NEURO:    CARDIOVASCULAR:  Hemodynamically stable          PULMONARY:  Keep O2 sat > 94%  Continue incentive spirometer    RENAL:  Voidind  IVL    GI:  Tolerating diet  Bowel regimen    HEME:  H/H stable    ID: Afebrile    ENDO:    DVT PROPHYLAXIS:  [x] Venodynes                                [x] Heparin/Lovenox    FALL RISK:  [x] Low Risk                                    [] Impulsive 51 yo F with PMHx significant herniated cervical disc who reports a few month history of left scapula and left arm pain associated with left arm weakness and numbness in left index finger. She underwent MRI which showed "herniated cervical disc." Conservative therapies include rest, ice, Baclofen, Meloxicam, PT, acupuncture, chiropractor and epidural injections with minimal relief of symptoms. She is scheduled for C5-6 and C6-7 Total Disc Replacement Anterior on 4/18/2023.     Denies Recent travel, Exposure or Covid symptoms  Covid test 4/15/23 (29 Mar 2023 15:07)    52y Female Post-op day # 1 s/p C5-7 TDR    PLAN:    NEURO:  neuro checks q 4 hrs  Oxycodone for pain  C/w Robaxin/Neurontin   OOB ambulate as tolerated  PT consult: no needs    CARDIOVASCULAR:  Hemodynamically stable    PULMONARY:  Keep O2 sat > 94%  Continue incentive spirometer    RENAL:  Voiding   IVL    GI:  Tolerating diet  Bowel regimen: Miralax/Senna    HEME:  H/H stable    ID: Afebrile    ENDO: no issue    DVT PROPHYLAXIS:  [x] Venodynes                                [x] Heparin/Lovenox    FALL RISK:  [x] Low Risk                                    [] Impulsive    Dispo: home today     Discussed with Dr Hinds  [id number]

## 2023-04-24 ENCOUNTER — NON-APPOINTMENT (OUTPATIENT)
Age: 53
End: 2023-04-24

## 2023-04-27 ENCOUNTER — NON-APPOINTMENT (OUTPATIENT)
Age: 53
End: 2023-04-27

## 2023-04-28 ENCOUNTER — RESULT REVIEW (OUTPATIENT)
Age: 53
End: 2023-04-28

## 2023-04-28 ENCOUNTER — APPOINTMENT (OUTPATIENT)
Dept: SPINE | Facility: CLINIC | Age: 53
End: 2023-04-28
Payer: COMMERCIAL

## 2023-04-28 VITALS
TEMPERATURE: 98.1 F | BODY MASS INDEX: 25.61 KG/M2 | SYSTOLIC BLOOD PRESSURE: 110 MMHG | HEIGHT: 58 IN | HEART RATE: 96 BPM | WEIGHT: 122 LBS | DIASTOLIC BLOOD PRESSURE: 77 MMHG | OXYGEN SATURATION: 95 %

## 2023-04-28 PROCEDURE — 99024 POSTOP FOLLOW-UP VISIT: CPT

## 2023-04-28 RX ORDER — BACLOFEN 10 MG/1
10 TABLET ORAL
Refills: 0 | Status: DISCONTINUED | COMMUNITY
End: 2023-04-28

## 2023-04-29 NOTE — ASSESSMENT
[FreeTextEntry1] : 52 year old female 10 days s/p C5-6 and C6-7 total disk replacement. She has had significant improvement neurologically. Dermabond dressing was removed and replaced with Steri strips. Wound care was discussed. The patient will continue on the antibiotics and call if she notices any more drainage, fever or chills. She was told to cover the incision if she goes out into the sun. Activity levels and proper body mechanics were done. The patient is to avoid bending and lifting. She will return in two weeks with a Cervical AP and Lat to see Dr. Hinds for review

## 2023-04-29 NOTE — PHYSICAL EXAM
[General Appearance - Alert] : alert [General Appearance - In No Acute Distress] : in no acute distress [Transverse] : transverse [Clean] : clean [Dry] : dry [Healing Well] : healing well [Intact] : intact [Normal Skin] : normal [Erythema] : erythematous [Generalized] : throughout the incision [Motor Strength] : muscle strength was normal in all four extremities [Motor Tone] : muscle tone was normal in all four extremities [Abnormal Walk] : normal gait [Balance] : balance was intact [FreeTextEntry6] : yellow crusting noted at both ends of the incision  [FreeTextEntry1] : s

## 2023-04-29 NOTE — HISTORY OF PRESENT ILLNESS
[FreeTextEntry1] : 52 year old female with PMHx significant herniated cervical disc who reported a few month history of left scapula and left arm pain associated with left arm weakness and numbness in left index finger. MRI of cervical spine revealed a large left C6-7 disc herniation with significant impingement of the left side of the cord in the left C7 nerve root. There is also some foraminal narrowing on the left at C5-6. She underwent C5-6 and C6-7 Total Disc Replacement on 4/18/2023 by Dr. Mick Hinds

## 2023-04-29 NOTE — REASON FOR VISIT
[Spouse] : spouse [de-identified] : C5-6 and C6-7 total disk replacement ([facility]). [de-identified] : 10 [de-identified] : 4/18/2023 [de-identified] : Today the patient reports she is doing well. Left upper extremity pain and numbness has resolved. She has also regained her strength. She occasionally has posterior neck and bilateral shoulder pain.  Tylenol, gabapentin and methocarbamol as needed with relief. She noted a small amount of yellowish drainage from incision 9 days post op.  Denies any difficulty swallowing, fever or chills. She was started on ciprofloxacin 750 mg BID x10 days which she started yesterday and tolerating well. \par Steri stips

## 2023-05-02 ENCOUNTER — APPOINTMENT (OUTPATIENT)
Dept: SPINE | Facility: CLINIC | Age: 53
End: 2023-05-02
Payer: COMMERCIAL

## 2023-05-02 ENCOUNTER — TRANSCRIPTION ENCOUNTER (OUTPATIENT)
Age: 53
End: 2023-05-02

## 2023-05-02 ENCOUNTER — INPATIENT (INPATIENT)
Facility: HOSPITAL | Age: 53
LOS: 0 days | Discharge: HOME | DRG: 863 | End: 2023-05-03
Attending: NEUROLOGICAL SURGERY | Admitting: NEUROLOGICAL SURGERY
Payer: COMMERCIAL

## 2023-05-02 VITALS
TEMPERATURE: 98 F | HEIGHT: 58 IN | OXYGEN SATURATION: 98 % | SYSTOLIC BLOOD PRESSURE: 133 MMHG | DIASTOLIC BLOOD PRESSURE: 87 MMHG | RESPIRATION RATE: 20 BRPM | WEIGHT: 121.92 LBS | HEART RATE: 105 BPM

## 2023-05-02 VITALS
WEIGHT: 122 LBS | HEART RATE: 98 BPM | DIASTOLIC BLOOD PRESSURE: 78 MMHG | BODY MASS INDEX: 25.61 KG/M2 | OXYGEN SATURATION: 94 % | SYSTOLIC BLOOD PRESSURE: 124 MMHG | HEIGHT: 58 IN

## 2023-05-02 VITALS — TEMPERATURE: 98.24 F

## 2023-05-02 DIAGNOSIS — Z29.9 ENCOUNTER FOR PROPHYLACTIC MEASURES, UNSPECIFIED: ICD-10-CM

## 2023-05-02 DIAGNOSIS — T81.49XA INFECTION FOLLOWING A PROCEDURE, OTHER SURGICAL SITE, INITIAL ENCOUNTER: ICD-10-CM

## 2023-05-02 DIAGNOSIS — Z01.818 ENCOUNTER FOR OTHER PREPROCEDURAL EXAMINATION: ICD-10-CM

## 2023-05-02 DIAGNOSIS — L24.A9 IRRITANT CONTACT DERMATITIS DUE FRICTION OR CONTACT WITH OTHER SPECIFIED BODY FLUIDS: ICD-10-CM

## 2023-05-02 DIAGNOSIS — Z98.891 HISTORY OF UTERINE SCAR FROM PREVIOUS SURGERY: Chronic | ICD-10-CM

## 2023-05-02 DIAGNOSIS — M50.20 OTHER CERVICAL DISC DISPLACEMENT, UNSPECIFIED CERVICAL REGION: ICD-10-CM

## 2023-05-02 DIAGNOSIS — Z98.51 TUBAL LIGATION STATUS: Chronic | ICD-10-CM

## 2023-05-02 LAB
ANION GAP SERPL CALC-SCNC: 12 MMOL/L — SIGNIFICANT CHANGE UP (ref 5–17)
APTT BLD: 31.9 SEC — SIGNIFICANT CHANGE UP (ref 27.5–35.5)
BLD GP AB SCN SERPL QL: NEGATIVE — SIGNIFICANT CHANGE UP
BUN SERPL-MCNC: 12 MG/DL — SIGNIFICANT CHANGE UP (ref 7–23)
CALCIUM SERPL-MCNC: 9.7 MG/DL — SIGNIFICANT CHANGE UP (ref 8.4–10.5)
CHLORIDE SERPL-SCNC: 107 MMOL/L — SIGNIFICANT CHANGE UP (ref 96–108)
CO2 SERPL-SCNC: 23 MMOL/L — SIGNIFICANT CHANGE UP (ref 22–31)
CREAT SERPL-MCNC: 0.74 MG/DL — SIGNIFICANT CHANGE UP (ref 0.5–1.3)
EGFR: 98 ML/MIN/1.73M2 — SIGNIFICANT CHANGE UP
GLUCOSE SERPL-MCNC: 122 MG/DL — HIGH (ref 70–99)
HCG SERPL-ACNC: <2 MIU/ML — SIGNIFICANT CHANGE UP
HCT VFR BLD CALC: 39.9 % — SIGNIFICANT CHANGE UP (ref 34.5–45)
HGB BLD-MCNC: 13.4 G/DL — SIGNIFICANT CHANGE UP (ref 11.5–15.5)
INR BLD: 1.04 RATIO — SIGNIFICANT CHANGE UP (ref 0.88–1.16)
MCHC RBC-ENTMCNC: 31 PG — SIGNIFICANT CHANGE UP (ref 27–34)
MCHC RBC-ENTMCNC: 33.6 GM/DL — SIGNIFICANT CHANGE UP (ref 32–36)
MCV RBC AUTO: 92.4 FL — SIGNIFICANT CHANGE UP (ref 80–100)
MRSA PCR RESULT.: SIGNIFICANT CHANGE UP
NRBC # BLD: 0 /100 WBCS — SIGNIFICANT CHANGE UP (ref 0–0)
PLATELET # BLD AUTO: 406 K/UL — HIGH (ref 150–400)
POTASSIUM SERPL-MCNC: 3.6 MMOL/L — SIGNIFICANT CHANGE UP (ref 3.5–5.3)
POTASSIUM SERPL-SCNC: 3.6 MMOL/L — SIGNIFICANT CHANGE UP (ref 3.5–5.3)
PROTHROM AB SERPL-ACNC: 12 SEC — SIGNIFICANT CHANGE UP (ref 10.5–13.4)
RBC # BLD: 4.32 M/UL — SIGNIFICANT CHANGE UP (ref 3.8–5.2)
RBC # FLD: 12.1 % — SIGNIFICANT CHANGE UP (ref 10.3–14.5)
RH IG SCN BLD-IMP: POSITIVE — SIGNIFICANT CHANGE UP
S AUREUS DNA NOSE QL NAA+PROBE: SIGNIFICANT CHANGE UP
SODIUM SERPL-SCNC: 142 MMOL/L — SIGNIFICANT CHANGE UP (ref 135–145)
WBC # BLD: 9.55 K/UL — SIGNIFICANT CHANGE UP (ref 3.8–10.5)
WBC # FLD AUTO: 9.55 K/UL — SIGNIFICANT CHANGE UP (ref 3.8–10.5)

## 2023-05-02 PROCEDURE — 99254 IP/OBS CNSLTJ NEW/EST MOD 60: CPT

## 2023-05-02 PROCEDURE — 99285 EMERGENCY DEPT VISIT HI MDM: CPT

## 2023-05-02 PROCEDURE — 99024 POSTOP FOLLOW-UP VISIT: CPT

## 2023-05-02 PROCEDURE — 72156 MRI NECK SPINE W/O & W/DYE: CPT | Mod: 26

## 2023-05-02 PROCEDURE — 99223 1ST HOSP IP/OBS HIGH 75: CPT

## 2023-05-02 RX ORDER — ONDANSETRON 8 MG/1
4 TABLET, FILM COATED ORAL EVERY 6 HOURS
Refills: 0 | Status: DISCONTINUED | OUTPATIENT
Start: 2023-05-02 | End: 2023-05-03

## 2023-05-02 RX ORDER — SODIUM CHLORIDE 9 MG/ML
1000 INJECTION, SOLUTION INTRAVENOUS
Refills: 0 | Status: DISCONTINUED | OUTPATIENT
Start: 2023-05-02 | End: 2023-05-03

## 2023-05-02 RX ORDER — CIPROFLOXACIN LACTATE 400MG/40ML
750 VIAL (ML) INTRAVENOUS
Refills: 0 | Status: DISCONTINUED | OUTPATIENT
Start: 2023-05-02 | End: 2023-05-02

## 2023-05-02 RX ORDER — MELOXICAM 15 MG/1
1 TABLET ORAL
Refills: 0 | DISCHARGE

## 2023-05-02 RX ORDER — POLYETHYLENE GLYCOL 3350 17 G/17G
17 POWDER, FOR SOLUTION ORAL DAILY
Refills: 0 | Status: DISCONTINUED | OUTPATIENT
Start: 2023-05-02 | End: 2023-05-03

## 2023-05-02 RX ORDER — OXYCODONE HYDROCHLORIDE 5 MG/1
5 TABLET ORAL EVERY 4 HOURS
Refills: 0 | Status: DISCONTINUED | OUTPATIENT
Start: 2023-05-02 | End: 2023-05-03

## 2023-05-02 RX ORDER — OXYCODONE HYDROCHLORIDE 5 MG/1
10 TABLET ORAL EVERY 4 HOURS
Refills: 0 | Status: DISCONTINUED | OUTPATIENT
Start: 2023-05-02 | End: 2023-05-03

## 2023-05-02 RX ORDER — SENNA PLUS 8.6 MG/1
2 TABLET ORAL AT BEDTIME
Refills: 0 | Status: DISCONTINUED | OUTPATIENT
Start: 2023-05-02 | End: 2023-05-03

## 2023-05-02 RX ORDER — LANOLIN ALCOHOL/MO/W.PET/CERES
3 CREAM (GRAM) TOPICAL AT BEDTIME
Refills: 0 | Status: DISCONTINUED | OUTPATIENT
Start: 2023-05-02 | End: 2023-05-03

## 2023-05-02 RX ORDER — POTASSIUM CHLORIDE 20 MEQ
40 PACKET (EA) ORAL ONCE
Refills: 0 | Status: COMPLETED | OUTPATIENT
Start: 2023-05-02 | End: 2023-05-02

## 2023-05-02 RX ORDER — ACETAMINOPHEN 500 MG
650 TABLET ORAL EVERY 6 HOURS
Refills: 0 | Status: DISCONTINUED | OUTPATIENT
Start: 2023-05-02 | End: 2023-05-03

## 2023-05-02 RX ADMIN — Medication 3 MILLIGRAM(S): at 21:38

## 2023-05-02 RX ADMIN — Medication 40 MILLIEQUIVALENT(S): at 18:33

## 2023-05-02 NOTE — ED PROVIDER NOTE - OBJECTIVE STATEMENT
53 yo F with PMHx significant herniated cervical disc who reports a few month history of left scapula and left arm pain associated with left arm weakness and numbness in left index finger. She underwent MRI which showed "herniated cervical disc." Conservative therapies include rest, ice, Baclofen, Meloxicam, PT, acupuncture, chiropractor and epidural injections with minimal relief of symptoms. She is scheduled for C5-6 and C6-7 Total Disc Replacement Anterior on 4/18/2023. 52 year old F with PMHx significant herniated cervical disc with associated left scapula and left arm pain associated with left arm weakness and numbness in left index finger s/p C5-6 and C6-7 Total Disc Replacement Anterior on 4/18/2023 presents to ED from [facility] office for wound site infection. Pt reports that 6 days ago she noticed redness and yellow changes to her surgical site and spoke to the office NP and was started on Cipro. She has been taking as prescribed but then 3-4 days ago saw what looked like a noriega that formed over the site which burst yesterday with reported yellow fluid which drained from wound. Pt expressed fluid from area and cleaned wound the best she can. She was seen in office today and refereed to ED for admission for washout procedure. Overall pt reports feeling well. She states neck pain is improved and numbness and weakness in the LUE resolved since procedure. She denies fevers, chills, difficulty swallowing, chest pain, sob, abd pain, n/v/d

## 2023-05-02 NOTE — ED ADULT NURSE NOTE - OBJECTIVE STATEMENT
53yo F aaox4 with no significant past medical history, presents to Ed from home, as per pt 2 weeks ago had surgery C5-7 , pt reports since last Thursday started with redness, swelling , called next day to RN and placed on ABX, last night  "burse" the surgical site: pus, on examinations site is redness, swelling, no pain, Pt denies CP, SOB, HA, vision changes, n/v/d, fevers chills,  Safety and comfort measures initiated- bed placed in lowest position and side rails raised.

## 2023-05-02 NOTE — ASSESSMENT
[FreeTextEntry1] : Renee Bravo is a 52 year old lady who underwent a C5-C6 and C6-C7 total disk replacement on 04/18/2023.  She presents today with an opening in her cervical incision on the medial side with some swelling and scant purulent drainage.  She has been on Ciprofloxacin since 04/28/2023.  The lateral end has a crusty type of scab and the middle is intact.  It was advised that the patient go to [facility] ER to be evaluated and the patient and her  agreed to go.  Dr. Hinds and the residents were made aware.

## 2023-05-02 NOTE — CONSULT NOTE ADULT - PROBLEM SELECTOR RECOMMENDATION 9
in setting of recent C5-7 anterior total disc replacement on 4/18/23  - patient afebrile, non-toxic appearing, with no leukocytosis  - has been on cipro 750mg BID since 4/27/23  - check MRSA PCR (ordered)  - send blood cultures x 2 (ordered)  - please obtain and send OR cultures though may be low yield as has been on abx therapy  - recommend ID consult; will defer abx regimen to ID  - f/u MRI C-spine

## 2023-05-02 NOTE — CONSULT NOTE ADULT - NSCONSULTADDITIONALINFOA_GEN_ALL_CORE
.  Poornima Baird MD  Division of Hospital Medicine  [facility]   Spectra: [phone]    Plan discussed with patient and neurosurgery resident Kirk.

## 2023-05-02 NOTE — H&P ADULT - HISTORY OF PRESENT ILLNESS
Lawrence, Tabathana  52F s/p C5-7 TDR w/ Dr. Hinds (4/18) for LUE radiculopathy, p/w repeated wound drainage from her incision site. A large amount of pus was released earlier today in office. The site was cleaned and now crusted over. She is afebrile and non-septic appearing. Exam: moderate swelling at incision site, mild erythema, o/w intact.

## 2023-05-02 NOTE — REASON FOR VISIT
[Spouse] : spouse [de-identified] : C5-6 and C6-7 total disk replacement (Mobi-C).  Surgeon:  Dr. Mick Hinds. [de-identified] : 4/18/2023 [de-identified] : 14 [de-identified] : 2 [de-identified] : The patient was seen on 04/28/2023 and it was noted that she had yellowish drainage to the medial side of the incision.  The incision was cleaned and the patient was started on Ciprofloxacin.  The patient returns today stating that the right side of the incision was swollen like a pimple and when she awoke, there was yellowish drainage on her shirt.  She stated that the area is open a little.  The patient stated regarding the results of the surgery, she is feeling better and denies having any arm pain or numbness.  She has been on Ciprofloxacin since Friday.

## 2023-05-02 NOTE — ED ADULT NURSE REASSESSMENT NOTE - NS ED NURSE REASSESS COMMENT FT1
Patient is well appearing, skin warm and intact, PERRL, EOM intact, sensory intact, equal strength b/l in all upper and lower extremities.

## 2023-05-02 NOTE — ED PROVIDER NOTE - CLINICAL SUMMARY MEDICAL DECISION MAKING FREE TEXT BOX
Medical decision making:  Patient with postop wound infection, will need preop labs, ECG n.p.o. at midnight.  Admit to neurosurgery.

## 2023-05-02 NOTE — CONSULT NOTE ADULT - SUBJECTIVE AND OBJECTIVE BOX
SURGERY CONSULT NOTE    Patient is a 52y old  Female who presents with a chief complaint of wound infection (02 May 2023 15:46)      HPI:  52F s/p C5-7 TDR w/ Dr. Hinds () for LUE radiculopathy, p/w repeated wound drainage from her incision site. Patient was seen in the NSGY office and a large amount of pus was released earlier today in office. The site was cleaned and now crusted over. She presented to the ED for examination.     PRS was consulted for assistance with wound closure in the OR.     PAST MEDICAL & SURGICAL HISTORY:  Cervical herniated disc    S/P     S/P tubal ligation    [  ] No significant past history as reviewed with the patient and family    FAMILY HISTORY:    [  ] Family history not pertinent as reviewed with the patient and family    SOCIAL HISTORY:    MEDICATIONS  (STANDING):  lactated ringers. 1000 milliLiter(s) (75 mL/Hr) IV Continuous <Continuous>  lactated ringers. 1000 milliLiter(s) (75 mL/Hr) IV Continuous <Continuous>  melatonin 3 milliGRAM(s) Oral at bedtime  polyethylene glycol 3350 17 Gram(s) Oral daily  senna 2 Tablet(s) Oral at bedtime    MEDICATIONS  (PRN):  acetaminophen     Tablet .. 650 milliGRAM(s) Oral every 6 hours PRN Temp greater or equal to 38C (100.4F), Mild Pain (1 - 3)  fentaNYL    Injectable 25 MICROGram(s) IV Push every 5 minutes PRN Moderate Pain (4 - 6)  ondansetron Injectable 4 milliGRAM(s) IV Push once PRN Nausea and/or Vomiting  ondansetron Injectable 4 milliGRAM(s) IV Push every 6 hours PRN Nausea and/or Vomiting  oxyCODONE    IR 5 milliGRAM(s) Oral every 4 hours PRN Moderate Pain (4 - 6)  oxyCODONE    IR 10 milliGRAM(s) Oral every 4 hours PRN Severe Pain (7 - 10)    Allergies    No Known Allergies    Intolerances    Vital Signs Last 24 Hrs  T(C): 36.5 (03 May 2023 09:05), Max: 36.9 (02 May 2023 16:33)  T(F): 97.7 (03 May 2023 09:05), Max: 98.5 (02 May 2023 16:33)  HR: 74 (03 May 2023 09:30) (66 - 105)  BP: 134/85 (03 May 2023 09:30) (118/79 - 145/80)  BP(mean): 104 (03 May 2023 09:30) (97 - 104)  RR: 16 (03 May 2023 09:30) (16 - 20)  SpO2: 99% (03 May 2023 09:30) (96% - 100%)    Parameters below as of 03 May 2023 09:30  Patient On (Oxygen Delivery Method): room air      Daily Height in cm: 147.32 (03 May 2023 07:12)    Daily       Physical exam:   General: Patient in NAD, laying in stretcher  HEENT: ~4cm incision across anterior portion of neck with small 0.5cm superficial dehiscence at medial portion of incision. Surrounding erythema around medial aspect of incision.                          13.4   9.55  )-----------( 406      ( 02 May 2023 15:18 )             39.9     05-02    142  |  107  |  12  ----------------------------<  122<H>  3.6   |  23  |  0.74    Ca    9.7      02 May 2023 15:18      PT/INR - ( 02 May 2023 15:18 )   PT: 12.0 sec;   INR: 1.04 ratio         PTT - ( 02 May 2023 15:18 )  PTT:31.9 sec      IMAGING STUDIES:  < from: MR Cervical Spine w/wo IV Cont (23 @ 17:33) >    INTERPRETATION:  MR cervical spine with and without gadolinium contrast    CLINICAL INFORMATION:    WOUND DRAINAGE C5-C6    TECHNIQUE: Sagittal T2-weighted, T1-weighted and STIR images were   obtained. Axial T2-weighted and T1-weighted  were obtained.  Following   gadolinium administration 5.5 cc administered sagittal and axial   fat-saturated T1-weighted images were obtained.    COMPARISON:   MR cervical 2023 from an outside site also CT cervical   3/7/2023 prior examinations are available for review.    FINDINGS:    Anterior stabilization is been performed at C5-C6 and at C6-C7 with   intervertebral disc spacing devices.These devices cause susceptibility   artifact partly obscuring the adjacent structures. Allowing for this   artifact, the devices appear appropriate in position. At these operated   levels artifact partly obscures the central canal, particularly on the   postgadolinium acquisition. Allowing for this artifact, the central canal   appears likely intact. The neural foramina appear largely intact partly   obscured. There appears to be mild asymmetric infiltration to the left of   midline anterior to the C7 vertebral body. However, no well-defined fluid   collection, significant enhancement or focal lesion is recognized at   these levels.    Cervical vertebral alignment is preserved.   Facet joints appear aligned.     Cervical vertebral body heights are maintained. Cervical vertebral   marrow signal intensity within the remaining cervical vertebra is intact.    No pathologic vertebral enhancement is found.  No osseous expansion or   epidural disease is found..  No destructive bone lesion is found.    The remaining cervical intervertebral disc spaces above at C2-C3, C3-C4   and C4-C5 and below at C7-T1 demonstrate preserved disc heights. Cervical   intervertebral disc signal intensity is maintained.  No abnormal disc   space enhancement is recognized.    At these levels no high-grade central   canal compromise is recognized.  Neural foramina appear intact.    Cervical cord morphology Is preserved.  The cervical cord maintains   intact signal intensity   No focal intrinsic cord lesion isidentified.    No cord expansion or cord volume loss is recognized.  No abnormal   enhancement occurs within the canal.    The visualized adjacent neck structures appear intact.  No neck mass is   recognized.  Paraspinal soft tissues appear intact.  Visualized lymph   nodes appear to be within physiologic size limits.      IMPRESSION:   Anterior stabilization with spacing devices introduced into   the C5-C6 and C6-C7 disc levels.   Artifact from this introduced hardware   largely obscures the C5-C7 vertebra and adjacent structures. Mild soft   tissue infiltration anterior to the C7 vertebral body to the left of   midline absent well-defined fluid collection. No abnormality of the   cervical cord is recognized. Consider contrast enhanced CT including   evaluation of the endplates for osteolysis    --- End of Report ---    < end of copied text >

## 2023-05-02 NOTE — ED ADULT TRIAGE NOTE - CHIEF COMPLAINT QUOTE
drainage and redness to surgical site on neck  cervical disc replacement surgery on 4/18/23  sent by Dr. Hinds for evaluation   denies difficulty breathing

## 2023-05-02 NOTE — H&P ADULT - ASSESSMENT
Lawrence, Ilena  52F s/p C5-7 TDR w/ Dr. Hinds (4/18) for LUE radiculopathy, p/w repeated wound drainage from her incision site. A large amount of pus was released earlier today in office. The site was cleaned and now crusted over. She is afebrile and non-septic appearing. Exam: moderate swelling at incision site, mild erythema, o/w intact.    -Admit to 7T under Dr. Hinds  -MRI w/wo C spine  -Added on for possible washout tomorrow at 7:30am  -Consulting plastic surgery for assistance  -Pre-op labs      Lawrence, Ilena  52F s/p C5-7 TDR w/ Dr. Hinds (4/18) for LUE radiculopathy, p/w repeated wound drainage from her incision site. A large amount of pus was released earlier today in office. The site was cleaned and now crusted over. She is afebrile and non-septic appearing. Exam: moderate swelling at incision site, mild erythema, o/w intact.    -Admit to 7T under Dr. Hinsd  -MRI w/wo C spine  -On schedule for washout tomorrow at 7:30am  -Consulting plastic surgery for assistance  -Pre-op labs

## 2023-05-02 NOTE — ED PROVIDER NOTE - ATTENDING APP SHARED VISIT CONTRIBUTION OF CARE
MD Juarez:  patient seen and evaluated with the PA.  I was present for key portions of the History and Physical, and I agree with the Impression and Plan.      Patient is a 52-year-old female complaining of right-sided neck discharge.  Patient had ACDF 1 week ago and has been having persistent pain in this area since the surgery.    NSGY is at the bedside, and is recommending that we admit for IV abx, and surgical washout tomorrow.      Associated Sx:  no weakness/numbness    VS: wnl.   Gen:  Well appearing in NAD.    Neck & Skin:  R lateral neck wound with scant drainage, +TTp  Head:  NC/AT.  Resp: No distress.    Ext: no deformities.    Neuro: alert and oriented to person, place, time.    Medical decision making:  Patient with postop wound infection, will need preop labs, ECG n.p.o. at midnight.  Admit to neurosurgery. MD Juarez:  patient seen and evaluated with the PA.  I was present for key portions of the History and Physical, and I agree with the Impression and Plan.      Patient is a 52-year-old female complaining of right-sided neck discharge.  Patient had ACDF 1 week ago at [facility], Dr. Hinds, and has been having persistent pain in this area since the surgery.    NSGY is at the bedside, and is recommending that we admit for IV abx, and surgical washout tomorrow.      Associated Sx:  no weakness/numbness    VS: wnl.   Gen:  Well appearing in NAD.    Neck & Skin:  R lateral neck wound with scant drainage, +TTp  Head:  NC/AT.  Resp: No distress.    Ext: no deformities.    Neuro: alert and oriented to person, place, time.    Medical decision making:  Patient with postop wound infection, will need preop labs, ECG n.p.o. at midnight.  Admit to neurosurgery.

## 2023-05-02 NOTE — CONSULT NOTE ADULT - ATTENDING COMMENTS
Patient with previous neck closure after anterior approach to spine, now with infection. Consult by neurosurgery for evaluation and assistance as needed in OR with closure. Will be available to assist. Infection likely superificial based on location and imaging and physical exam.

## 2023-05-02 NOTE — CONSULT NOTE ADULT - PROBLEM SELECTOR RECOMMENDATION 2
- patient with METS>4. no active chest pain nor dyspnea on exertion  - RCRI risk 0 pts, Class I Risk, 3.9% 30 day risk of death, MI, or cardiac arrest  - no contraindication from medicine standpoint for OR tomorrow 5/3/23  - would obtain baseline EKG but will not change above medical clearance

## 2023-05-02 NOTE — ED PROVIDER NOTE - PHYSICAL EXAMINATION
CONSTITUTIONAL: Patient is awake, alert and oriented x 3. Patient is well appearing and in no acute distress  HEAD: NCAT  EYES: PERRL b/l, EOMI  ENT:Airway patent, Nasal mucosa clear. Mouth with normal mucosa. Throat has no vesicles, no oropharyngeal exudates and uvula is midline  NECK: supple, FROM  LUNGS: CTA b/l, no wheezing or rales   HEART: RRR.+S1S2 no murmurs  ABDOMEN: Soft, non-distended, nttp,  no rebound or guarding  EXTREMITY: no edema or calf tenderness b/l, FROM upper and lower ext b/l  SKIN: with no rash or lesions  NEURO: Cn3-12 grossly intact. Strength 5/5 UE/LE b/l .Nml gross sensation UE/LE b/l. Gait normal CONSTITUTIONAL: Patient is awake, alert and oriented x 3. Patient is well appearing and in no acute distress  HEAD: NCAT  NECK: steri-strips overlying anterior L sided neck horizontal incision site with mild surrounding erythema, no active bleeding or drainage   LUNGS: CTA b/l, no wheezing or rales   HEART: RRR.+S1S2 no murmurs  ABDOMEN: Soft, non-distended, nttp,  no rebound or guarding  EXTREMITY: no edema or calf tenderness b/l, FROM upper and lower ext b/l  SKIN: see "NECK" otherwise with no rash or lesions  NEURO: Cn3-12 grossly intact. Strength 5/5 UE/LE b/l .Nml gross sensation UE/LE b/l

## 2023-05-02 NOTE — PHYSICAL EXAM
[General Appearance - Alert] : alert [General Appearance - In No Acute Distress] : in no acute distress [General Appearance - Well Nourished] : well nourished [General Appearance - Well Developed] : well developed [General Appearance - Well-Appearing] : healthy appearing [] : normal voice and communication [] :  [Purulent] : exhibiting purulent drainage [Erythema] : not erythematous [Medial Portion] : medial portion [FreeTextEntry1] : around the open area.

## 2023-05-02 NOTE — CONSULT NOTE ADULT - ASSESSMENT
52F otherwise healthy who developed sudden LUE radiculopathy Feb 2023.  Work up revealed cervical disk herniation.  Went to ortho who recommended fusion.  Sought second opinion.  4/18/2023 underwent C5-7 disk arthroplasty.  Discharged without complications.  Then 4/26, she noted wound drainage from medial aspect of wound.  Was prescribed ciprofloxacin.  No cultures were taken.  It dried up and scabbed.  Then 5/1, it again opened up and drained pus.  No cultures taken.  Was referred for washout.  No fever.  No pain.  ROS Otherwise negative.    Post-op wound infection  - most likely staph  - would like cultures off antibiotics  - for washout tomorrow  - please send gram stain, culture, fungal and AFB cultures as well  - for MRI to assess for extent of infection  - not clear if there is hardware placed    Leukocytosis  - BC sent  - continue to trend wbc      
53 yo female with of herniated cervical dis with associated left scapula + left arm pain and left arm weakness and numbness in left index finger who is now s/p C5-7 anterior total disc replacement on 4/18/23 who presents from the Neurosurgery outpatient office for concern for wound site infection with incision site drainage planned for OR tomorrow 5/3/23 for washout. Medicine consulted for pre-op clearance.    PCP: Dr. Nisha Branch
A: Renee is a 51yo F who is s/p C5-C7 TDR with Dr. Hinds who now presents with concern of drainage from incision and medial dehiscence. Plan to RTOR with NSGY tomorrow for exploration and closure by PRS.     -Will consent patient  -Plan to RTOR with NSGY  -Rest per primary team    Patient and plan discussed with Dr. Kareem Queen Douglas  Plastic Surgery  #[phone] [facility] pager  [phone] [facility] pager  Available on teams

## 2023-05-02 NOTE — ED PROVIDER NOTE - NS ED ATTENDING STATEMENT MOD
This was a shared visit with the VASHTI. I reviewed and verified the documentation and independently performed the documented:

## 2023-05-02 NOTE — CONSULT NOTE ADULT - SUBJECTIVE AND OBJECTIVE BOX
Poornima Baird MD  Division of Hospital Medicine  [facility]   Spectra: [phone]    PCP: Dr. Nisha Branch     HPI:  Renee Bravo  51 yo female with of herniated cervical dis with associated left scapula + left arm pain and left arm weakness and numbness in left index finger who is now s/p C5-7 anterior total disc replacement on 23 who presents from the Neurosurgery outpatient office for concern for wound site infection states that 6 days ago (last Wednesday), she began to noticed her surgical site appearing yellow and irritated. She called the neurosurgery office and was prescribed cipro 750mg PO BID which she has been taking as prescribed. Over the course of the next few days, the wound appeared to swell and burst yesterday with yellow/pink drainage from the wound opening. She was seen in the office today and sent to ED for washout scheduled for tomorrow 5/3/23. Denies any fever, chills, chest pain, sob, cough, difficulty swallow, abd pain, n/v, dysuria nor diarrhea. Overall feels well. Medicine consulted for pre-op clearance.    Interval History: patient seen and examined in the ED. ROS as above. no  fever, chills, chest pain, sob, cough, difficulty swallow, abd pain, n/v, dysuria nor diarrhea. has been ambulating post-procedure/post-discharge with no issues. no active chest pain nor dyspnea on exertion.    PAST MEDICAL & SURGICAL HISTORY:  Cervical herniated disc  S/P   S/P tubal ligation    Home Medications reviewed [x]:  acetaminophen 325 mg oral tablet: 2 orally every 6 hours as needed for  mild pain (02 May 2023 15:22)  ciprofloxacin 750 mg oral tablet: 1 orally 2 times a day (02 May 2023 15:06)      Review of Systems:   CONSTITUTIONAL: No fever, chills  HEENT: No sore throat, vision changes  RESPIRATORY: No cough, wheezing, shortness of breath  CARDIOVASCULAR: No chest pain, palpitations, leg edema  GASTROINTESTINAL: No abdominal pain, nausea, vomiting, diarrhea or constipation. No melena or hematochezia.  GENITOURINARY: No dysuria, frequency, hematuria  NEUROLOGICAL: No headaches, memory loss, loss of strength, numbness, or tremors  SKIN: No itching, burning, +drainage from incision site  MUSCULOSKELETAL: No joint pain or swelling; No muscle, back, or extremity pain  PSYCHIATRIC: No depression, anxiety  HEME/LYMPH: No easy bruising, or bleeding gums      Allergies  No Known Allergies    Intolerances      Social History:   denies smoking   social EtOH   works as a teacher    FAMILY HISTORY:  non-contributory    MEDICATIONS  (STANDING):  ciprofloxacin     Tablet 750 milliGRAM(s) Oral two times a day  lactated ringers. 1000 milliLiter(s) (75 mL/Hr) IV Continuous <Continuous>  polyethylene glycol 3350 17 Gram(s) Oral daily  senna 2 Tablet(s) Oral at bedtime    MEDICATIONS  (PRN):  acetaminophen     Tablet .. 650 milliGRAM(s) Oral every 6 hours PRN Temp greater or equal to 38C (100.4F), Mild Pain (1 - 3)  ondansetron Injectable 4 milliGRAM(s) IV Push every 6 hours PRN Nausea and/or Vomiting  oxyCODONE    IR 5 milliGRAM(s) Oral every 4 hours PRN Moderate Pain (4 - 6)  oxyCODONE    IR 10 milliGRAM(s) Oral every 4 hours PRN Severe Pain (7 - 10)    CAPILLARY BLOOD GLUCOSE    I&O's Summary      Physical Exam:  Vital Signs Last 24 Hrs  T(C): 36.8 (02 May 2023 12:53), Max: 36.8 (02 May 2023 12:53)  T(F): 98.3 (02 May 2023 12:53), Max: 98.3 (02 May 2023 12:53)  HR: 105 (02 May 2023 12:53) (105 - 105)  BP: 133/87 (02 May 2023 12:53) (133/87 - 133/87)  BP(mean): --  RR: 20 (02 May 2023 12:53) (20 - 20)  SpO2: 98% (02 May 2023 12:53) (98% - 98%)    Parameters below as of 02 May 2023 12:53  Patient On (Oxygen Delivery Method): room air      CONSTITUTIONAL: NAD, well-developed, well-groomed  EYES: PERRLA; conjunctiva and sclera clear  ENMT: Moist oral mucosa, no pharyngeal injection or exudates; normal dentition  NECK: Supple, no palpable masses; no thyromegaly  RESPIRATORY: Normal respiratory effort; lungs are clear to auscultation bilaterally  CARDIOVASCULAR: Regular rate and rhythm, normal S1 and S2, no murmur/rub/gallop; No lower extremity edema; Peripheral pulses are 2+ bilaterally  ABDOMEN: Soft, Nondistended, Nontender to palpation, normoactive bowel sounds  MUSCULOSKELETAL:  No clubbing or cyanosis of digits; no joint swelling or tenderness to palpation  PSYCH: A+O to person, place, and time; affect appropriate  NEUROLOGY: CN 2-12 are intact and symmetric; no gross sensory deficits   SKIN: No rashes; no palpable lesions, +anterior cervical incision site with steristrips with underlying swelling and mild erythema    LABS:                        13.4   9.55  )-----------( 406      ( 02 May 2023 15:18 )             39.9         RADIOLOGY & ADDITIONAL TESTS:  Results Reviewed: no leukocytosis, H/H stable  Imaging Personally Reviewed:  Electrocardiogram Personally Reviewed:    COORDINATION OF CARE:  Care Discussed with Consultants/Other Providers [Y]: Neurosurgery resident Kirk  Prior or Outpatient Records Reviewed [Y/N]:   Poornima Baird MD  Division of Hospital Medicine  [facility]   Spectra: [phone]    PCP: Dr. Nisha Branch     HPI:  53 yo female with PMH of herniated cervical dis with associated left scapula + left arm pain and left arm weakness and numbness in left index finger who is now s/p C5-7 anterior total disc replacement on 23 who presents from the Neurosurgery outpatient office for concern for wound site infection states that 6 days ago (last Wednesday), she began to noticed her surgical site appearing yellow and irritated. She called the neurosurgery office and was prescribed cipro 750mg PO BID which she has been taking as prescribed. Over the course of the next few days, the wound appeared to swell and burst yesterday with yellow/pink drainage from the wound opening. She was seen in the office today and sent to ED for washout scheduled for tomorrow 5/3/23. Denies any fever, chills, chest pain, sob, cough, difficulty swallow, abd pain, n/v, dysuria nor diarrhea. Overall feels well. Medicine consulted for pre-op clearance.    Interval History: patient seen and examined in the ED. ROS as above. no  fever, chills, chest pain, sob, cough, difficulty swallow, abd pain, n/v, dysuria nor diarrhea. has been ambulating post-procedure/post-discharge with no issues. no active chest pain nor dyspnea on exertion.    PAST MEDICAL & SURGICAL HISTORY:  Cervical herniated disc  S/P   S/P tubal ligation    Home Medications reviewed [x]:  acetaminophen 325 mg oral tablet: 2 orally every 6 hours as needed for  mild pain (02 May 2023 15:22)  ciprofloxacin 750 mg oral tablet: 1 orally 2 times a day (02 May 2023 15:06)      Review of Systems:   CONSTITUTIONAL: No fever, chills  HEENT: No sore throat, vision changes  RESPIRATORY: No cough, wheezing, shortness of breath  CARDIOVASCULAR: No chest pain, palpitations, leg edema  GASTROINTESTINAL: No abdominal pain, nausea, vomiting, diarrhea or constipation. No melena or hematochezia.  GENITOURINARY: No dysuria, frequency, hematuria  NEUROLOGICAL: No headaches, memory loss, loss of strength, numbness, or tremors  SKIN: No itching, burning, +drainage from incision site  MUSCULOSKELETAL: No joint pain or swelling; No muscle, back, or extremity pain  PSYCHIATRIC: No depression, anxiety  HEME/LYMPH: No easy bruising, or bleeding gums      Allergies  No Known Allergies    Intolerances      Social History:   denies smoking   social EtOH   works as a teacher    FAMILY HISTORY:  non-contributory    MEDICATIONS  (STANDING):  ciprofloxacin     Tablet 750 milliGRAM(s) Oral two times a day  lactated ringers. 1000 milliLiter(s) (75 mL/Hr) IV Continuous <Continuous>  polyethylene glycol 3350 17 Gram(s) Oral daily  senna 2 Tablet(s) Oral at bedtime    MEDICATIONS  (PRN):  acetaminophen     Tablet .. 650 milliGRAM(s) Oral every 6 hours PRN Temp greater or equal to 38C (100.4F), Mild Pain (1 - 3)  ondansetron Injectable 4 milliGRAM(s) IV Push every 6 hours PRN Nausea and/or Vomiting  oxyCODONE    IR 5 milliGRAM(s) Oral every 4 hours PRN Moderate Pain (4 - 6)  oxyCODONE    IR 10 milliGRAM(s) Oral every 4 hours PRN Severe Pain (7 - 10)    CAPILLARY BLOOD GLUCOSE    I&O's Summary      Physical Exam:  Vital Signs Last 24 Hrs  T(C): 36.8 (02 May 2023 12:53), Max: 36.8 (02 May 2023 12:53)  T(F): 98.3 (02 May 2023 12:53), Max: 98.3 (02 May 2023 12:53)  HR: 105 (02 May 2023 12:53) (105 - 105)  BP: 133/87 (02 May 2023 12:53) (133/87 - 133/87)  BP(mean): --  RR: 20 (02 May 2023 12:53) (20 - 20)  SpO2: 98% (02 May 2023 12:53) (98% - 98%)    Parameters below as of 02 May 2023 12:53  Patient On (Oxygen Delivery Method): room air      CONSTITUTIONAL: NAD, well-developed, well-groomed  EYES: PERRLA; conjunctiva and sclera clear  ENMT: Moist oral mucosa, no pharyngeal injection or exudates; normal dentition  NECK: Supple, no palpable masses; no thyromegaly  RESPIRATORY: Normal respiratory effort; lungs are clear to auscultation bilaterally  CARDIOVASCULAR: Regular rate and rhythm, normal S1 and S2, no murmur/rub/gallop; No lower extremity edema; Peripheral pulses are 2+ bilaterally  ABDOMEN: Soft, Nondistended, Nontender to palpation, normoactive bowel sounds  MUSCULOSKELETAL:  No clubbing or cyanosis of digits; no joint swelling or tenderness to palpation  PSYCH: A+O to person, place, and time; affect appropriate  NEUROLOGY: CN 2-12 are intact and symmetric; no gross sensory deficits   SKIN: No rashes; no palpable lesions, +anterior cervical incision site with steristrips with underlying swelling and mild erythema    LABS:                        13.4   9.55  )-----------( 406      ( 02 May 2023 15:18 )             39.9         RADIOLOGY & ADDITIONAL TESTS:  Results Reviewed: no leukocytosis, H/H stable  Imaging Personally Reviewed:  Electrocardiogram Personally Reviewed:    COORDINATION OF CARE:  Care Discussed with Consultants/Other Providers [Y]: Neurosurgery resident Kirk  Prior or Outpatient Records Reviewed [Y/N]:

## 2023-05-02 NOTE — CONSULT NOTE ADULT - SUBJECTIVE AND OBJECTIVE BOX
Patient is a 52y old  Female who presents with a chief complaint of surgical site drainage (02 May 2023 15:24)    HPI:  52F otherwise healthy who developed sudden LUE radiculopathy 2023.  Work up revealed cervical disk herniation.  Went to ortho who recommended fusion.  Sought second opinion.  2023 underwent C5-7 disk arthroplasty.  Discharged without complications.  Then , she noted wound drainage from medial aspect of wound.  Was prescribed ciprofloxacin.  No cultures were taken.  It dried up and scabbed.  Then , it again opened up and drained pus.  No cultures taken.  Was referred for washout.  No fever.  No pain.  ROS Otherwise negative.    ID asked to help management    prior hospital charts reviewed [  ]  primary team notes reviewed [ x ]  other consultant notes reviewed [  x]    PAST MEDICAL & SURGICAL HISTORY:  Cervical herniated disc  S/P   S/P tubal ligation    Allergies  No Known Allergies    ANTIMICROBIALS:  ciprofloxacin     Tablet 750 two times a day (-)    MEDICATIONS  (STANDING):  acetaminophen     Tablet .. 650 every 6 hours PRN  ondansetron Injectable 4 every 6 hours PRN  oxyCODONE    IR 5 every 4 hours PRN  oxyCODONE    IR 10 every 4 hours PRN  polyethylene glycol 3350 17 daily  senna 2 at bedtime    SOCIAL HISTORY:   not a smoker, , has a dog, no etoh    FAMILY HISTORY:  n/c    REVIEW OF SYSTEMS  [  ] ROS unobtainable because:    [ x ] All other systems negative except as noted below:	    Constitutional:  [ ] fever [ ] chills  [ ] weight loss  [ ] weakness  Skin:  [x ] drainage from surgical site since  [ ] phlebitis	  Eyes: [ ] icterus [ ] pain  [ ] discharge	  ENMT: [ ] sore throat  [ ] thrush [ ] ulcers [ ] exudates  Respiratory: [ ] dyspnea [ ] hemoptysis [ ] cough [ ] sputum	  Cardiovascular:  [ ] chest pain [ ] palpitations [ ] edema	  Gastrointestinal:  [ ] nausea [ ] vomiting [ ] diarrhea [ ] constipation [ ] pain	  Genitourinary:  [ ] dysuria [ ] frequency [ ] hematuria [ ] discharge [ ] flank pain  [ ] incontinence  Musculoskeletal:  [ ] myalgias [ ] arthralgias [ ] arthritis  [ ] back pain  Neurological:  [ ] headache [ ] seizures  [ ] confusion/altered mental status  Psychiatric:  [ ] anxiety [ ] depression	  Hematology/Lymphatics:  [ ] lymphadenopathy  Endocrine:  [ ] adrenal [ ] thyroid  Allergic/Immunologic:	 [ ] transplant [ ] seasonal    Vital Signs Last 24 Hrs  T(F): 98.3 (23 @ 12:53), Max: 98.3 (23 @ 12:53)  Vital Signs Last 24 Hrs  HR: 105 (23 @ 12:53) (105 - 105)  BP: 133/87 (23 @ 12:53) (133/87 - 133/87)  RR: 20 (23 @ 12:53)  SpO2: 98% (23 @ 12:53) (98% - 98%)  Wt(kg): --    PHYSICAL EXAM:  Constitutional: non-toxic  HEAD/EYES: anicteric  ENT:  anterior surgical site with steri-strips, no current drainage  Cardiovascular:   normal S1, S2  Respiratory:  clear BS bilaterally  GI:  soft, non-tender, normal bowel sounds  :  no hammond  Musculoskeletal:  no synovitis, normal ROM  Neurologic: awake and alert, normal strength, no focal findings  Skin:  as noted above  Psychiatric:  awake, alert, appropriate mood                       13.4   9.55  )-----------( 406      ( 02 May 2023 15:18 )             39.9     MICROBIOLOGY:    RADIOLOGY:  imaging below personally reviewed and agree with findings    3/7/2023 CT neck  Impression:  At C6-C7, there is a left subarticular/foraminal disc protrusion, resulting in moderate left neural foraminal narrowing, mild central canal narrowing, and moderate narrowing of the left lateral recess. This is better assessed on prior MRI. Findings appear similar to prior MRI given difference in technique.  Indeterminate pulmonary nodule within the right upper lobe, measuring up to 3 mm. According to Fleischner 2017 guidelines, if the patient is at high risk for developing lung cancer, optional chest CT can be completed in 12 months for further evaluation.

## 2023-05-03 ENCOUNTER — TRANSCRIPTION ENCOUNTER (OUTPATIENT)
Age: 53
End: 2023-05-03

## 2023-05-03 ENCOUNTER — APPOINTMENT (OUTPATIENT)
Dept: PLASTIC SURGERY | Facility: HOSPITAL | Age: 53
End: 2023-05-03
Payer: COMMERCIAL

## 2023-05-03 ENCOUNTER — APPOINTMENT (OUTPATIENT)
Dept: SPINE | Facility: HOSPITAL | Age: 53
End: 2023-05-03

## 2023-05-03 VITALS
OXYGEN SATURATION: 96 % | HEART RATE: 76 BPM | TEMPERATURE: 99 F | DIASTOLIC BLOOD PRESSURE: 80 MMHG | SYSTOLIC BLOOD PRESSURE: 114 MMHG | RESPIRATION RATE: 18 BRPM

## 2023-05-03 PROCEDURE — 87040 BLOOD CULTURE FOR BACTERIA: CPT

## 2023-05-03 PROCEDURE — 80048 BASIC METABOLIC PNL TOTAL CA: CPT

## 2023-05-03 PROCEDURE — 72156 MRI NECK SPINE W/O & W/DYE: CPT | Mod: MA

## 2023-05-03 PROCEDURE — 85730 THROMBOPLASTIN TIME PARTIAL: CPT

## 2023-05-03 PROCEDURE — 99233 SBSQ HOSP IP/OBS HIGH 50: CPT | Mod: 57

## 2023-05-03 PROCEDURE — 21501 I&D DP ABSC/HMTMA SFT TS NCK: CPT | Mod: 78,GC

## 2023-05-03 PROCEDURE — 87116 MYCOBACTERIA CULTURE: CPT

## 2023-05-03 PROCEDURE — 14301 TIS TRNFR ANY 30.1-60 SQ CM: CPT

## 2023-05-03 PROCEDURE — 85027 COMPLETE CBC AUTOMATED: CPT

## 2023-05-03 PROCEDURE — 87640 STAPH A DNA AMP PROBE: CPT

## 2023-05-03 PROCEDURE — 84702 CHORIONIC GONADOTROPIN TEST: CPT

## 2023-05-03 PROCEDURE — 86850 RBC ANTIBODY SCREEN: CPT

## 2023-05-03 PROCEDURE — 87070 CULTURE OTHR SPECIMN AEROBIC: CPT

## 2023-05-03 PROCEDURE — 87641 MR-STAPH DNA AMP PROBE: CPT

## 2023-05-03 PROCEDURE — 99232 SBSQ HOSP IP/OBS MODERATE 35: CPT

## 2023-05-03 PROCEDURE — 86900 BLOOD TYPING SEROLOGIC ABO: CPT

## 2023-05-03 PROCEDURE — A9585: CPT

## 2023-05-03 PROCEDURE — 87206 SMEAR FLUORESCENT/ACID STAI: CPT

## 2023-05-03 PROCEDURE — 11045 DBRDMT SUBQ TISS EACH ADDL: CPT | Mod: 59

## 2023-05-03 PROCEDURE — 87102 FUNGUS ISOLATION CULTURE: CPT

## 2023-05-03 PROCEDURE — 11042 DBRDMT SUBQ TIS 1ST 20SQCM/<: CPT | Mod: 59

## 2023-05-03 PROCEDURE — C9399: CPT

## 2023-05-03 PROCEDURE — 14302 TIS TRNFR ADDL 30 SQ CM: CPT

## 2023-05-03 PROCEDURE — 85610 PROTHROMBIN TIME: CPT

## 2023-05-03 PROCEDURE — 99285 EMERGENCY DEPT VISIT HI MDM: CPT | Mod: 25

## 2023-05-03 PROCEDURE — 86901 BLOOD TYPING SEROLOGIC RH(D): CPT

## 2023-05-03 RX ORDER — CEPHALEXIN 500 MG
1 CAPSULE ORAL
Qty: 28 | Refills: 0
Start: 2023-05-03 | End: 2023-05-09

## 2023-05-03 RX ORDER — CEPHALEXIN 500 MG
500 CAPSULE ORAL
Refills: 0 | Status: DISCONTINUED | OUTPATIENT
Start: 2023-05-03 | End: 2023-05-03

## 2023-05-03 RX ORDER — ACETAMINOPHEN 500 MG
2 TABLET ORAL
Qty: 0 | Refills: 0 | DISCHARGE
Start: 2023-05-03

## 2023-05-03 RX ORDER — ONDANSETRON 8 MG/1
4 TABLET, FILM COATED ORAL ONCE
Refills: 0 | Status: DISCONTINUED | OUTPATIENT
Start: 2023-05-03 | End: 2023-05-03

## 2023-05-03 RX ORDER — OXYCODONE HYDROCHLORIDE 5 MG/1
1 TABLET ORAL
Qty: 0 | Refills: 0 | DISCHARGE
Start: 2023-05-03

## 2023-05-03 RX ORDER — ACETAMINOPHEN 500 MG
2 TABLET ORAL
Refills: 0 | DISCHARGE

## 2023-05-03 RX ORDER — FENTANYL CITRATE 50 UG/ML
25 INJECTION INTRAVENOUS
Refills: 0 | Status: DISCONTINUED | OUTPATIENT
Start: 2023-05-03 | End: 2023-05-03

## 2023-05-03 RX ORDER — SENNA PLUS 8.6 MG/1
2 TABLET ORAL
Qty: 0 | Refills: 0 | DISCHARGE
Start: 2023-05-03

## 2023-05-03 RX ORDER — POLYETHYLENE GLYCOL 3350 17 G/17G
17 POWDER, FOR SOLUTION ORAL
Qty: 0 | Refills: 0 | DISCHARGE
Start: 2023-05-03

## 2023-05-03 RX ORDER — CIPROFLOXACIN LACTATE 400MG/40ML
1 VIAL (ML) INTRAVENOUS
Refills: 0 | DISCHARGE

## 2023-05-03 RX ORDER — SODIUM CHLORIDE 9 MG/ML
1000 INJECTION, SOLUTION INTRAVENOUS
Refills: 0 | Status: DISCONTINUED | OUTPATIENT
Start: 2023-05-03 | End: 2023-05-03

## 2023-05-03 RX ADMIN — FENTANYL CITRATE 25 MICROGRAM(S): 50 INJECTION INTRAVENOUS at 09:46

## 2023-05-03 RX ADMIN — OXYCODONE HYDROCHLORIDE 10 MILLIGRAM(S): 5 TABLET ORAL at 11:05

## 2023-05-03 RX ADMIN — OXYCODONE HYDROCHLORIDE 10 MILLIGRAM(S): 5 TABLET ORAL at 10:35

## 2023-05-03 RX ADMIN — Medication 500 MILLIGRAM(S): at 15:18

## 2023-05-03 RX ADMIN — FENTANYL CITRATE 25 MICROGRAM(S): 50 INJECTION INTRAVENOUS at 09:48

## 2023-05-03 RX ADMIN — FENTANYL CITRATE 25 MICROGRAM(S): 50 INJECTION INTRAVENOUS at 09:40

## 2023-05-03 RX ADMIN — SODIUM CHLORIDE 75 MILLILITER(S): 9 INJECTION, SOLUTION INTRAVENOUS at 10:34

## 2023-05-03 RX ADMIN — SODIUM CHLORIDE 75 MILLILITER(S): 9 INJECTION, SOLUTION INTRAVENOUS at 00:01

## 2023-05-03 RX ADMIN — FENTANYL CITRATE 25 MICROGRAM(S): 50 INJECTION INTRAVENOUS at 09:25

## 2023-05-03 NOTE — DISCHARGE NOTE PROVIDER - NSDCMRMEDTOKEN_GEN_ALL_CORE_FT
acetaminophen 325 mg oral tablet: 2 tab(s) orally every 6 hours As needed Temp greater or equal to 38C (100.4F), Mild Pain (1 - 3)  cephalexin 500 mg oral capsule: 1 cap(s) orally 4 times a day  oxyCODONE 10 mg oral tablet: 1 tab(s) orally every 4 hours As needed Severe Pain (7 - 10)  oxyCODONE 5 mg oral tablet: 1 tab(s) orally every 4 hours As needed Moderate Pain (4 - 6)  polyethylene glycol 3350 oral powder for reconstitution: 17 gram(s) orally once a day  senna leaf extract oral tablet: 2 tab(s) orally once a day (at bedtime)

## 2023-05-03 NOTE — DISCHARGE NOTE PROVIDER - HOSPITAL COURSE
HPI:  Renee Bravo  52F s/p C5-7 TDR w/ Dr. Hinds (4/18) for LUE radiculopathy, p/w repeated wound drainage from her incision site. A large amount of pus was released earlier today in office. The site was cleaned and now crusted over. She is afebrile and non-septic appearing. Exam: moderate swelling at incision site, mild erythema, o/w intact.   (02 May 2023 14:28)    5/3/23 s/p S/P anterior cervical wound exploration and washout with plastic surgery closure. She had routine post op care and did well. ID recommended starting empiric antibiotics and   OR cultures to be followed up outpatient. On the day of discharge she is medically and neurologically stable for discharge to home.

## 2023-05-03 NOTE — PROGRESS NOTE ADULT - REASON FOR ADMISSION
wound infection
5/3/23 s/p S/P anterior cervical wound exploration and washout with plastic surgery closure.

## 2023-05-03 NOTE — DISCHARGE NOTE NURSING/CASE MANAGEMENT/SOCIAL WORK - PATIENT PORTAL LINK FT
You can access the FollowMyHealth Patient Portal offered by [facility] by registering at the following website: [email or web address]. By joining [facility]’s FollowMyHealth portal, you will also be able to view your health information using other applications (apps) compatible with our system.

## 2023-05-03 NOTE — DISCHARGE NOTE PROVIDER - CARE PROVIDER_API CALL
Mick Hinds (MD)  Neurosurgery  [address], [address]  [place] [address]  Phone: [phone]  Fax: [phone]  Follow Up Time:

## 2023-05-03 NOTE — DISCHARGE NOTE PROVIDER - NSDCFUSCHEDAPPT_GEN_ALL_CORE_FT
[facility]  [facility] [id number]  Scheduled Appointment: 05/11/2023

## 2023-05-03 NOTE — BRIEF OPERATIVE NOTE - SPECIMENS
None
1 deep and 1 superficial culture swab sent for gram stain, culture, fungal, and AFB (per ID recs)

## 2023-05-03 NOTE — BRIEF OPERATIVE NOTE - OPERATION/FINDINGS
Exploration of neck wound by NSGY (please see their brief op for their portion of the procedure). PRS primary closure of incision in layers.

## 2023-05-03 NOTE — DISCHARGE NOTE PROVIDER - NSDCCPCAREPLAN_GEN_ALL_CORE_FT
PRINCIPAL DISCHARGE DIAGNOSIS  Diagnosis: Wound drainage  Assessment and Plan of Treatment: 5/3/23 s/p S/P anterior cervical wound exploration and washout with plastic surgery closure.   Dr Hinds- neurosurgeon, please call office for follow up appointment.   PCP within 1-2 weeks.

## 2023-05-03 NOTE — PROGRESS NOTE ADULT - SUBJECTIVE AND OBJECTIVE BOX
52F s/p C5-7 TDR w/ Dr. Hinds (4/18) for LUE radiculopathy, p/w repeated wound drainage from her incision site. A large amount of pus was released earlier today in office. The site was cleaned and now crusted over. She is afebrile and non-septic appearing. Exam: moderate swelling at incision site, mild erythema, o/w intact.  (02 May 2023 14:28)    Vital Signs Last 24 Hrs  T(C): 37.4 (03 May 2023 13:19), Max: 37.4 (03 May 2023 13:19)  T(F): 99.3 (03 May 2023 13:19), Max: 99.3 (03 May 2023 13:19)  HR: 76 (03 May 2023 13:19) (66 - 101)  BP: 114/80 (03 May 2023 13:19) (114/80 - 146/81)  BP(mean): 108 (03 May 2023 12:30) (88 - 108)  RR: 18 (03 May 2023 13:19) (16 - 20)  SpO2: 96% (03 May 2023 13:19) (96% - 100%)    Parameters below as of 03 May 2023 13:19  Patient On (Oxygen Delivery Method): room air    A+O x 3 speech clear, tolerating PO diet well, c/o mild pain relieved with oxy she has at home.   SOTOMAYOR x 4 with 5/5/ strength.  Incision- + dressing c.d.i    Discussed with patient and family wound care, follow up plans, activity, and medications. Questions answered, and they verbalized understanding.   RXs sent to [facility]- flex    5/3/23 s/p S/P anterior cervical wound exploration and washout with plastic surgery closure. She had routine post op care and did well. ID recommended starting empiric antibiotics and   OR cultures to be followed up outpatient. On the day of discharge she is medically and neurologically stable for discharge to home.     discussed with Dr Hinds  [id number]      
Patient is a 52y old  Female who presents with a chief complaint of surgical site drainage (02 May 2023 15:24)    f/u wound infection    Interval History/ROS:  no fever/chills. post-op in PACU s/p washout of wound.  per neurosurgery, all superficial, no pus encountered.   no n/v/d.  no abdominal pain.  no dysuria.  Remainder of ROS otherwise negative.    PAST MEDICAL & SURGICAL HISTORY:  Cervical herniated disc  S/P   S/P tubal ligation    Allergies  No Known Allergies    ANTIMICROBIALS:  ciprofloxacin     Tablet 750 two times a day (-)    MEDICATIONS  (STANDING):  melatonin 3 at bedtime  polyethylene glycol 3350 17 daily  senna 2 at bedtime    Vital Signs Last 24 Hrs  T(F): 97.2 (23 @ 12:00), Max: 98.5 (23 @ 16:33)  HR: 82 (23 @ 12:30)  BP: 146/81 (23 @ 12:30)  RR: 18 (23 @ 12:30)  SpO2: 98% (23 @ 12:30) (96% - 100%)  Wt(kg): --    PHYSICAL EXAM:  Constitutional: non-toxic  HEAD/EYES: anicteric  ENT:  anterior surgical site with fresh dressing that is c/d/i  Cardiovascular:   not tachycardic  Respiratory:  not tachypneic  GI:  soft, non-tender  :  no hammond  Musculoskeletal:  no synovitis  Neurologic: awake and alert, normal strength, no focal findings  Skin:  as noted above  Psychiatric:  awake, alert, appropriate mood                                13.4   9.55  )-----------( 406      ( 02 May 2023 15:18 )             39.9     142  |  107  |  12  ----------------------------<  122  3.6   |  23  |  0.74  Ca    9.7      02 May 2023 15:18    MICROBIOLOGY:  5/3 OR cultures pending    RADIOLOGY:  imaging below personally reviewed and agree with findings    MR Cervical Spine w/wo IV Cont (23 @ 17:33) >  IMPRESSION:   Anterior stabilization with spacing devices introduced into the C5-C6 and C6-C7 disc levels.   Artifact from this introduced hardware largely obscures the C5-C7 vertebra and adjacent structures. Mild soft   tissue infiltration anterior to the C7 vertebral body to the left of midline absent well-defined fluid collection. No abnormality of the cervical cord is recognized. Consider contrast enhanced CT including evaluation of the endplates for osteolysis    3/7/2023 CT neck  Impression:  At C6-C7, there is a left subarticular/foraminal disc protrusion, resulting in moderate left neural foraminal narrowing, mild central canal narrowing, and moderate narrowing of the left lateral recess. This is better assessed on prior MRI. Findings appear similar to prior MRI given difference in technique.  Indeterminate pulmonary nodule within the right upper lobe, measuring up to 3 mm. According to Fleischner 2017 guidelines, if the patient is at high risk for developing lung cancer, optional chest CT can be completed in 12 months for further evaluation.

## 2023-05-03 NOTE — PROGRESS NOTE ADULT - ASSESSMENT
52F otherwise healthy who developed sudden LUE radiculopathy Feb 2023.  Work up revealed cervical disk herniation.  Went to ortho who recommended fusion.  Sought second opinion.  4/18/2023 underwent C5-7 disk arthroplasty.  Discharged without complications.  Then 4/26, she noted wound drainage from medial aspect of wound.  Was prescribed ciprofloxacin.  No cultures were taken.  It dried up and scabbed.  Then 5/1, it again opened up and drained pus.  No cultures taken.  Was referred for washout.  No fever.  No pain.  ROS Otherwise negative.    Post-op wound infection  - most likely staph  - f/u all cultures  - patient is planned for discharge today on empiric coverage  - can start keflex 500mg po q6  - can limit to one week given superficial infection    I have discussed plan of care as detailed above with neurosurgical attending and PA

## 2023-05-03 NOTE — DISCHARGE NOTE PROVIDER - NSDCFUADDINST_GEN_ALL_CORE_FT
please notify physician if fevers, bleeding, swelling, pain not relieved by medication, increased sluggishness or irritability, increased nausea or vomiting, inability to tolerate foods or liquids, new or increasing weakness/numbness/tingling.   please do not engage in strenuous activity, heavy lifting, drive, or return to work or school until cleared by surgeon.   please keep incision clean and dry, do not submerge wound in water for prolonged periods of time, pat dry after showering, and do not use any creams or ointments to incision.   You may shower today and not get neck wet. You may fully shower tomorrow and remove dressing after, pat dry, and leave open to air. Please let steri strips fall off.   Please cover for next shower using gauze and tegaderm tapes provided.  Please do not take NSAIDs- ie. advil, motrin, ibuprofen, aleve, aspirin, naproxen, etc. Tylenol/ acetaminophen ok to take.

## 2023-05-03 NOTE — BRIEF OPERATIVE NOTE - NSICDXBRIEFPROCEDURE_GEN_ALL_CORE_FT
PROCEDURES:  Closure, wound, secondary 03-May-2023 09:30:38  Bret Grullon  
PROCEDURES:  Exploration, wound, neck 03-May-2023 08:28:07  Juan M Jones

## 2023-05-04 LAB
NIGHT BLUE STAIN TISS: SIGNIFICANT CHANGE UP
NIGHT BLUE STAIN TISS: SIGNIFICANT CHANGE UP
SPECIMEN SOURCE: SIGNIFICANT CHANGE UP
SPECIMEN SOURCE: SIGNIFICANT CHANGE UP

## 2023-05-05 ENCOUNTER — APPOINTMENT (OUTPATIENT)
Dept: PLASTIC SURGERY | Facility: CLINIC | Age: 53
End: 2023-05-05
Payer: COMMERCIAL

## 2023-05-05 VITALS
TEMPERATURE: 208.4 F | HEART RATE: 72 BPM | HEIGHT: 58 IN | OXYGEN SATURATION: 99 % | BODY MASS INDEX: 25.61 KG/M2 | WEIGHT: 122 LBS | DIASTOLIC BLOOD PRESSURE: 78 MMHG | SYSTOLIC BLOOD PRESSURE: 146 MMHG

## 2023-05-05 PROCEDURE — 99024 POSTOP FOLLOW-UP VISIT: CPT

## 2023-05-06 NOTE — ASSESSMENT
[FreeTextEntry1] : doing well overall. incision well healed. no drainage or seapartion. no erythema. ok to shower and steri strips will fall off on their own. will plan to see her back in one moth for wound check. activity per neurosurgery with no restrictions from my position.

## 2023-05-06 NOTE — REASON FOR VISIT
[Post Op: _________] : a [unfilled] post op visit [Spouse] : spouse [FreeTextEntry1] : s/p incision and drainage of anterior cervical post-operative wound with plastics closure DOS: 05/03/23. Patient reports improving soreness of the incision site and denies draining or bleeding. She states she has avoided moving her neck as much as possible as she feels some tightness, but states she is otherwise doing well with no other concerns or complaints.

## 2023-05-07 LAB
CULTURE RESULTS: SIGNIFICANT CHANGE UP
CULTURE RESULTS: SIGNIFICANT CHANGE UP
SPECIMEN SOURCE: SIGNIFICANT CHANGE UP
SPECIMEN SOURCE: SIGNIFICANT CHANGE UP

## 2023-05-08 ENCOUNTER — NON-APPOINTMENT (OUTPATIENT)
Age: 53
End: 2023-05-08

## 2023-05-08 LAB
CULTURE RESULTS: NO GROWTH — SIGNIFICANT CHANGE UP
CULTURE RESULTS: SIGNIFICANT CHANGE UP
SPECIMEN SOURCE: SIGNIFICANT CHANGE UP
SPECIMEN SOURCE: SIGNIFICANT CHANGE UP

## 2023-05-10 ENCOUNTER — OUTPATIENT (OUTPATIENT)
Dept: OUTPATIENT SERVICES | Facility: HOSPITAL | Age: 53
LOS: 1 days | End: 2023-05-10
Payer: COMMERCIAL

## 2023-05-10 DIAGNOSIS — Z98.891 HISTORY OF UTERINE SCAR FROM PREVIOUS SURGERY: Chronic | ICD-10-CM

## 2023-05-10 DIAGNOSIS — Z98.890 OTHER SPECIFIED POSTPROCEDURAL STATES: ICD-10-CM

## 2023-05-10 DIAGNOSIS — Z98.51 TUBAL LIGATION STATUS: Chronic | ICD-10-CM

## 2023-05-10 PROCEDURE — 72040 X-RAY EXAM NECK SPINE 2-3 VW: CPT | Mod: 26

## 2023-05-11 ENCOUNTER — APPOINTMENT (OUTPATIENT)
Dept: SPINE | Facility: CLINIC | Age: 53
End: 2023-05-11
Payer: COMMERCIAL

## 2023-05-11 VITALS
WEIGHT: 122 LBS | OXYGEN SATURATION: 98 % | HEIGHT: 58 IN | SYSTOLIC BLOOD PRESSURE: 127 MMHG | HEART RATE: 74 BPM | DIASTOLIC BLOOD PRESSURE: 81 MMHG | BODY MASS INDEX: 25.61 KG/M2

## 2023-05-11 PROCEDURE — 99024 POSTOP FOLLOW-UP VISIT: CPT

## 2023-05-11 RX ORDER — FUROSEMIDE 80 MG/1
TABLET ORAL
Refills: 0 | Status: DISCONTINUED | COMMUNITY
End: 2023-05-11

## 2023-05-11 RX ORDER — OXYCODONE HYDROCHLORIDE 5 MG/1
5 CAPSULE ORAL
Qty: 28 | Refills: 0 | Status: DISCONTINUED | COMMUNITY
Start: 2023-04-19 | End: 2023-05-11

## 2023-05-11 RX ORDER — MELOXICAM 15 MG/1
15 TABLET ORAL
Refills: 0 | Status: DISCONTINUED | COMMUNITY
End: 2023-05-11

## 2023-05-11 RX ORDER — METHYLPREDNISOLONE 4 MG/1
4 TABLET ORAL
Qty: 21 | Refills: 0 | Status: DISCONTINUED | COMMUNITY
Start: 2023-04-24 | End: 2023-05-11

## 2023-05-11 RX ORDER — METHOCARBAMOL 750 MG/1
TABLET, FILM COATED ORAL
Refills: 0 | Status: DISCONTINUED | COMMUNITY
End: 2023-05-11

## 2023-05-11 RX ORDER — FAMOTIDINE 20 MG/1
20 TABLET, FILM COATED ORAL
Qty: 20 | Refills: 0 | Status: DISCONTINUED | COMMUNITY
Start: 2023-04-19 | End: 2023-05-11

## 2023-05-11 NOTE — HISTORY OF PRESENT ILLNESS
[FreeTextEntry1] : 52 year old female with PMHx significant herniated cervical disc who reported a few month history of left scapula and left arm pain associated with left arm weakness and numbness in left index finger. MRI of cervical spine revealed a large left C6-7 disc herniation with significant impingement of the left side of the cord in the left C7 nerve root. There is also some foraminal narrowing on the left at C5-6. She underwent C5-6 and C6-7 Total Disc Replacement on 4/18/2023 \par \par Patient noted drainage from incision 9 days post op and was started on ciprofloxacin. 14 days post op  (5/2/2023) she noted increased drainage.  5/3/23 s/p S/P anterior cervical wound exploration and washout with plastic surgery closure. She was started on  empiric antibiotics Keflex. OR cultures were negative. Patient reported dizziness and GI side effects Keflex discontinued on 5/8/2023.  Recent x-rays show no change in hardware or alignment.\par \par

## 2023-05-11 NOTE — PHYSICAL EXAM
[Healing Well] : healing well [No Drainage] : without drainage [Erythema] : not erythematous [Motor Strength] : muscle strength was normal in all four extremities [Abnormal Walk] : normal gait

## 2023-05-11 NOTE — REASON FOR VISIT
[Other: _____] : [unfilled] [de-identified] : C5-6 and C6-7 total disk replacement ([employer]) [de-identified] : 4/18/2023 [de-identified] : 3 [de-identified] : Today she reports all preoperative pain has resolved. She does report occasional pain in trapezius muscles. She has been experiencing dizziness with specific positions for one week. She states the dizziness is gradually improving. Her incision is healing well. No signs/symptoms of infection.

## 2023-05-11 NOTE — ASSESSMENT
[FreeTextEntry1] : 52 year old female 3 weeks s/p C5-6 and C6-7 total disk replacement and one week s/p incision and drainage of wound with plastic surgery closure. Cultures were negative. Doing well. Activity levels and proper body mechanics were done. The patient is to avoid any heavy lifting. She will follow up in two months with cervical flexion-extension x-rays.

## 2023-05-26 ENCOUNTER — APPOINTMENT (OUTPATIENT)
Dept: PLASTIC SURGERY | Facility: CLINIC | Age: 53
End: 2023-05-26
Payer: COMMERCIAL

## 2023-05-26 VITALS
HEIGHT: 58 IN | TEMPERATURE: 98 F | SYSTOLIC BLOOD PRESSURE: 120 MMHG | OXYGEN SATURATION: 100 % | BODY MASS INDEX: 25.61 KG/M2 | HEART RATE: 65 BPM | WEIGHT: 122 LBS | DIASTOLIC BLOOD PRESSURE: 82 MMHG

## 2023-05-26 DIAGNOSIS — Z51.89 ENCOUNTER FOR OTHER SPECIFIED AFTERCARE: ICD-10-CM

## 2023-05-26 DIAGNOSIS — L24.A9 IRRITANT CONT DERMATITIS DUE FRICTION OR CONT W OTHER SPECIFIED BODY FLUIDS: ICD-10-CM

## 2023-05-26 PROCEDURE — 99024 POSTOP FOLLOW-UP VISIT: CPT

## 2023-07-06 ENCOUNTER — NON-APPOINTMENT (OUTPATIENT)
Age: 53
End: 2023-07-06

## 2023-07-11 ENCOUNTER — APPOINTMENT (OUTPATIENT)
Dept: RADIOLOGY | Facility: CLINIC | Age: 53
End: 2023-07-11
Payer: COMMERCIAL

## 2023-07-11 PROCEDURE — 72052 X-RAY EXAM NECK SPINE 6/>VWS: CPT

## 2023-07-13 ENCOUNTER — APPOINTMENT (OUTPATIENT)
Dept: SPINE | Facility: CLINIC | Age: 53
End: 2023-07-13
Payer: COMMERCIAL

## 2023-07-13 ENCOUNTER — RESULT REVIEW (OUTPATIENT)
Age: 53
End: 2023-07-13

## 2023-07-13 VITALS
WEIGHT: 122 LBS | OXYGEN SATURATION: 96 % | SYSTOLIC BLOOD PRESSURE: 120 MMHG | DIASTOLIC BLOOD PRESSURE: 87 MMHG | HEART RATE: 74 BPM | BODY MASS INDEX: 25.61 KG/M2 | HEIGHT: 58 IN

## 2023-07-13 PROCEDURE — 99024 POSTOP FOLLOW-UP VISIT: CPT

## 2023-07-13 RX ORDER — CIPROFLOXACIN HYDROCHLORIDE 750 MG/1
750 TABLET, FILM COATED ORAL
Qty: 20 | Refills: 0 | Status: DISCONTINUED | COMMUNITY
Start: 2023-04-27 | End: 2023-07-13

## 2023-07-13 NOTE — REASON FOR VISIT
[Follow-Up: _____] : a [unfilled] follow-up visit [Other: _____] : [unfilled] [FreeTextEntry1] : Ms. Bravo underwent C5-6 and C6-7 Total Disc Replacement on 4/18/2023. S/P anterior cervical wound exploration and washout with plastic surgery closure on 5/3/2023. OR cultures were negative. Preoperative pain in left arm has resolved and she has regained her strength. She has occasional pain in the trapezius. She receives medical massages and trigger point injection which are helpful. Recent x-rays show no change in hardware or alignment.\par Recent cervical spine x-rays show essentially normal motion and otherwise no significant changes in her two-level TDR.\par

## 2023-07-13 NOTE — ASSESSMENT
[FreeTextEntry1] : 52 year old female 3 months s/p C5-6 and C6-7 total disk replacement and one week s/p incision and drainage of wound with plastic surgery closure. Cultures were negative. Doing well overall. Activity levels and proper body mechanics were done. The patient is to avoid any heavy lifting. She will follow up in three months with cervical flexion-extension x-rays. \par

## 2023-08-14 ENCOUNTER — APPOINTMENT (OUTPATIENT)
Dept: GASTROENTEROLOGY | Facility: CLINIC | Age: 53
End: 2023-08-14
Payer: COMMERCIAL

## 2023-08-14 PROCEDURE — 99443: CPT

## 2023-08-22 NOTE — HISTORY OF PRESENT ILLNESS
[FreeTextEntry1] : 52 y.o. F presents today for a wound check, she is  s/p incision and drainage of anterior cervical post-operative wound with plastics closure DOS: 05/03/23. She reports doing well and is without any major complaints today, she does report feeling a small suture at her incision line. Otherwise she is in good spirits and denies pain, fever, chills, redness, swelling.

## 2023-08-22 NOTE — PHYSICAL EXAM
[de-identified] : neck incision well healed, small suture knot removed. no hypertrohy, scar soft.

## 2023-10-18 ENCOUNTER — APPOINTMENT (OUTPATIENT)
Dept: RADIOLOGY | Facility: CLINIC | Age: 53
End: 2023-10-18
Payer: COMMERCIAL

## 2023-10-18 PROCEDURE — 72052 X-RAY EXAM NECK SPINE 6/>VWS: CPT

## 2023-11-09 ENCOUNTER — APPOINTMENT (OUTPATIENT)
Dept: SPINE | Facility: CLINIC | Age: 53
End: 2023-11-09
Payer: COMMERCIAL

## 2023-11-09 VITALS
SYSTOLIC BLOOD PRESSURE: 114 MMHG | DIASTOLIC BLOOD PRESSURE: 75 MMHG | OXYGEN SATURATION: 96 % | HEART RATE: 63 BPM | HEIGHT: 58 IN | WEIGHT: 122 LBS | BODY MASS INDEX: 25.61 KG/M2

## 2023-11-09 DIAGNOSIS — Z98.890 OTHER SPECIFIED POSTPROCEDURAL STATES: ICD-10-CM

## 2023-11-09 PROCEDURE — 99213 OFFICE O/P EST LOW 20 MIN: CPT

## 2024-07-12 ENCOUNTER — OFFICE (OUTPATIENT)
Dept: URBAN - METROPOLITAN AREA CLINIC 104 | Facility: CLINIC | Age: 54
Setting detail: OPHTHALMOLOGY
End: 2024-07-12
Payer: COMMERCIAL

## 2024-07-12 DIAGNOSIS — H01.002: ICD-10-CM

## 2024-07-12 DIAGNOSIS — H01.005: ICD-10-CM

## 2024-07-12 DIAGNOSIS — H01.004: ICD-10-CM

## 2024-07-12 DIAGNOSIS — H01.001: ICD-10-CM

## 2024-07-12 PROBLEM — H25.13 CATARACT SENILE NUCLEAR SCLEROSIS; BOTH EYES: Status: ACTIVE | Noted: 2024-07-12

## 2024-07-12 PROCEDURE — 92014 COMPRE OPH EXAM EST PT 1/>: CPT | Performed by: OPTOMETRIST

## 2024-07-12 ASSESSMENT — LID EXAM ASSESSMENTS
OD_BLEPHARITIS: RLL RUL 1+
OS_BLEPHARITIS: LLL LUL 1+

## 2024-07-12 ASSESSMENT — CONFRONTATIONAL VISUAL FIELD TEST (CVF)
OD_FINDINGS: FULL
OS_FINDINGS: FULL

## 2025-07-18 ENCOUNTER — OFFICE (OUTPATIENT)
Dept: URBAN - METROPOLITAN AREA CLINIC 104 | Facility: CLINIC | Age: 55
Setting detail: OPHTHALMOLOGY
End: 2025-07-18
Payer: COMMERCIAL

## 2025-07-18 DIAGNOSIS — H25.13: ICD-10-CM

## 2025-07-18 DIAGNOSIS — G43.109: ICD-10-CM

## 2025-07-18 DIAGNOSIS — H01.005: ICD-10-CM

## 2025-07-18 DIAGNOSIS — H01.001: ICD-10-CM

## 2025-07-18 DIAGNOSIS — H01.004: ICD-10-CM

## 2025-07-18 DIAGNOSIS — H01.002: ICD-10-CM

## 2025-07-18 DIAGNOSIS — H35.362: ICD-10-CM

## 2025-07-18 PROCEDURE — 92250 FUNDUS PHOTOGRAPHY W/I&R: CPT | Performed by: OPTOMETRIST

## 2025-07-18 PROCEDURE — 92014 COMPRE OPH EXAM EST PT 1/>: CPT | Performed by: OPTOMETRIST

## 2025-07-18 ASSESSMENT — REFRACTION_AUTOREFRACTION
OS_AXIS: 104
OD_CYLINDER: -1.25
OS_SPHERE: +1.25
OD_SPHERE: +1.50
OS_CYLINDER: -1.50
OD_AXIS: 088

## 2025-07-18 ASSESSMENT — REFRACTION_CURRENTRX
OS_SPHERE: +2.00
OS_OVR_VA: 20/
OD_SPHERE: +2.00
OD_OVR_VA: 20/

## 2025-07-18 ASSESSMENT — CONFRONTATIONAL VISUAL FIELD TEST (CVF)
OD_FINDINGS: FULL
OS_FINDINGS: FULL

## 2025-07-18 ASSESSMENT — VISUAL ACUITY
OD_BCVA: 20/20
OS_BCVA: 20/20

## 2025-07-18 ASSESSMENT — LID EXAM ASSESSMENTS
OS_BLEPHARITIS: LLL LUL 1+
OD_BLEPHARITIS: RLL RUL 1+

## (undated) DEVICE — NDL SPINAL 18G X 3.5" (PINK)

## (undated) DEVICE — SUT BIOSYN 4-0 18" P-12

## (undated) DEVICE — MIDAS REX MR8 BALL FLUTED SM BORE 3MM X 10CM

## (undated) DEVICE — DRAIN JACKSON PRATT 7MM FLAT FULL W 15 FR TROCAR

## (undated) DEVICE — PACK LUMBAR LAMI

## (undated) DEVICE — SPECIMEN CONTAINER 100ML

## (undated) DEVICE — DRSG MASTISOL

## (undated) DEVICE — DRSG TELFA 3 X 8

## (undated) DEVICE — Device

## (undated) DEVICE — SPONGE PEANUT AUTO COUNT

## (undated) DEVICE — GLV 8 DERMAPRENE ULTRA

## (undated) DEVICE — PREP CHLORAPREP HI-LITE ORANGE 26ML

## (undated) DEVICE — FOLEY TRAY 16FR 5CC LTX UMETER CLOSED

## (undated) DEVICE — MIDAS REX MR8 BALL FLUTED SM BORE 4MM X 10CM

## (undated) DEVICE — SOL IRR POUR NS 0.9% 500ML

## (undated) DEVICE — DRAIN RESERVOIR FOR JACKSON PRATT 100CC CARDINAL

## (undated) DEVICE — FOLEY TRAY 16FR LF URINE METER SURESTEP

## (undated) DEVICE — GLV 7.5 PROTEXIS (WHITE)

## (undated) DEVICE — POSITIONER FOAM EGG CRATE ULNAR 2PCS (PINK)

## (undated) DEVICE — DRAPE 3/4 SHEET W REINFORCEMENT 56X77"

## (undated) DEVICE — MARKING PEN W RULER

## (undated) DEVICE — DRAPE 1/2 SHEET 40X57"

## (undated) DEVICE — GLV 6.5 PROTEXIS (WHITE)

## (undated) DEVICE — VENODYNE/SCD SLEEVE CALF LARGE

## (undated) DEVICE — STAPLER SKIN VISI-STAT 35 WIDE

## (undated) DEVICE — GLV 7 PROTEXIS (WHITE)

## (undated) DEVICE — DRAPE TOWEL BLUE 17" X 24"

## (undated) DEVICE — DRAPE MAYO STAND 30"

## (undated) DEVICE — DRAPE C ARM UNIVERSAL

## (undated) DEVICE — WARMING BLANKET LOWER ADULT

## (undated) DEVICE — DRAIN JACKSON PRATT 10MM FLAT FULL NO TROCAR

## (undated) DEVICE — DRAIN JACKSON PRATT 3 SPRING RESERVOIR W 10FR PVC DRAIN

## (undated) DEVICE — SUT VICRYL 2-0 18" CP-2 UNDYED (POP-OFF)

## (undated) DEVICE — SOL IRR POUR H2O 250ML

## (undated) DEVICE — POSITIONER CUSHION INSERT PRONE VIEW LG

## (undated) DEVICE — SUT MONOSOF 2-0 18" C-15

## (undated) DEVICE — DRAIN JACKSON PRATT 7MM FLAT FULL NO TROCAR

## (undated) DEVICE — DRSG TAPE HYPAFIX 4"

## (undated) DEVICE — DRSG DERMABOND PRINEO 60CM

## (undated) DEVICE — SUT SOFSILK 2-0 18" TIES

## (undated) DEVICE — MIDAS REX MR7 LUBRICANT DIFFUSER CARTRIDGE

## (undated) DEVICE — CANISTER SUCTION 2000CC

## (undated) DEVICE — DRSG TEGADERM 4X4.75"

## (undated) DEVICE — GLV 8 PROTEXIS (WHITE)

## (undated) DEVICE — MEDICATION LABELS W MARKER

## (undated) DEVICE — ELCTR BIPOLAR PROBE

## (undated) DEVICE — WARMING BLANKET UPPER ADULT

## (undated) DEVICE — DRSG XEROFORM 1 X 8"

## (undated) DEVICE — BIPOLAR FORCEP SYMMETRY BAYONET 7" X 1.5MM SMOOTH (SILVER)

## (undated) DEVICE — STRYKER INTERPULSE HANDPIECE W IRR SUCTION TUBE

## (undated) DEVICE — GOWN TRIMAX LG

## (undated) DEVICE — SUT ETHILON 3-0 30" FS-1

## (undated) DEVICE — DRSG STERISTRIPS 0.5 X 4"

## (undated) DEVICE — GLV 8.5 PROTEXIS (WHITE)

## (undated) DEVICE — DRAPE EQUIPMENT COVER 27"

## (undated) DEVICE — VISITEC 4X4

## (undated) DEVICE — DRSG CURITY GAUZE SPONGE 4 X 4" 12-PLY

## (undated) DEVICE — SYR LUER LOK 20CC

## (undated) DEVICE — SUT VICRYL 3-0 18" X-1 (POP-OFF)

## (undated) DEVICE — SUT MONOSOF 3-0 18" C-14

## (undated) DEVICE — SUT VICRYL 0 18" OS-6 (POP-OFF)

## (undated) DEVICE — DRAPE LIGHT HANDLE COVER (GREEN)